# Patient Record
Sex: MALE | Race: WHITE | Employment: FULL TIME | URBAN - METROPOLITAN AREA
[De-identification: names, ages, dates, MRNs, and addresses within clinical notes are randomized per-mention and may not be internally consistent; named-entity substitution may affect disease eponyms.]

---

## 2017-09-11 ENCOUNTER — OFFICE VISIT (OUTPATIENT)
Dept: OPTOMETRY | Facility: CLINIC | Age: 42
End: 2017-09-11
Payer: COMMERCIAL

## 2017-09-11 DIAGNOSIS — H52.13 MYOPIA OF BOTH EYES: Primary | ICD-10-CM

## 2017-09-11 DIAGNOSIS — H52.223 REGULAR ASTIGMATISM OF BOTH EYES: ICD-10-CM

## 2017-09-11 DIAGNOSIS — Z83.511 FAMILY HISTORY OF GLAUCOMA IN MOTHER: ICD-10-CM

## 2017-09-11 PROCEDURE — 92014 COMPRE OPH EXAM EST PT 1/>: CPT | Performed by: OPTOMETRIST

## 2017-09-11 PROCEDURE — 92340 FIT SPECTACLES MONOFOCAL: CPT | Performed by: OPTOMETRIST

## 2017-09-11 PROCEDURE — 92015 DETERMINE REFRACTIVE STATE: CPT | Performed by: OPTOMETRIST

## 2017-09-11 PROCEDURE — 92310 CONTACT LENS FITTING OU: CPT | Mod: GA | Performed by: OPTOMETRIST

## 2017-09-11 ASSESSMENT — KERATOMETRY
OS_AXISANGLE2_DEGREES: 13
OS_K1POWER_DIOPTERS: 47.75
OS_K2POWER_DIOPTERS: 47.50
OD_AXISANGLE2_DEGREES: 18
OD_K1POWER_DIOPTERS: 47.75
OD_K2POWER_DIOPTERS: 47

## 2017-09-11 ASSESSMENT — CUP TO DISC RATIO
OD_RATIO: 0.55
OS_RATIO: 0.65

## 2017-09-11 ASSESSMENT — REFRACTION_CURRENTRX
OD_CYLINDER: -1.25
OD_BRAND: ALCON AIR OPTIX AQUA FOR ASTIGMATISM
OD_SPHERE: -2.75
OS_BRAND: COOPER PROCLEAR 1 DAY
OS_DIAMETER: 14.2
OD_DIAMETER: 14.5
OS_DIAMETER: 14.2
OD_SPHERE: -2.50
OS_SPHERE: -2.50
OD_BASECURVE: 8.7
OD_BASECURVE: 8.7
OD_CYLINDER: -0.75
OD_AXIS: 110
OD_BRAND: ALCON AIR OPTIX AQUA FOR ASTIGMATISM
OS_BASECURVE: 8.7
OD_AXIS: 110
OD_DIAMETER: 14.5
OS_BRAND: COOPER PROCLEAR 1 DAY
OS_SPHERE: -2.50
OS_BASECURVE: 8.7

## 2017-09-11 ASSESSMENT — REFRACTION_MANIFEST
OD_CYLINDER: +1.25
OS_AXIS: 178
OS_SPHERE: -2.75
OD_AXIS: 008
OD_SPHERE: -4.00
METHOD_AUTOREFRACTION: 1
OD_SPHERE: -3.75
OS_AXIS: 165
OD_CYLINDER: +1.25
OS_SPHERE: -2.50
OD_AXIS: 023
OS_CYLINDER: +0.75
OS_CYLINDER: +0.50

## 2017-09-11 ASSESSMENT — VISUAL ACUITY
OD_CC: 20/20
METHOD: SNELLEN - LINEAR
OS_CC: 20/25
OD_CC: 20/20
OS_CC: 20/20
OS_CC: 20/20
OD_CC: 20/20
OD_CC: 20/20
CORRECTION_TYPE: CONTACTS
METHOD: SNELLEN - LINEAR
CORRECTION_TYPE: GLASSES
OS_CC: 20-1

## 2017-09-11 ASSESSMENT — REFRACTION_WEARINGRX
OS_SPHERE: -2.75
SPECS_TYPE: SVL
OS_AXIS: 170
OD_AXIS: 030
OD_CYLINDER: +1.00
OD_SPHERE: -4.00
OS_CYLINDER: +0.50

## 2017-09-11 ASSESSMENT — CONF VISUAL FIELD
OD_NORMAL: 1
METHOD: COUNTING FINGERS
OS_NORMAL: 1

## 2017-09-11 ASSESSMENT — EXTERNAL EXAM - LEFT EYE: OS_EXAM: NORMAL

## 2017-09-11 ASSESSMENT — TONOMETRY
OS_IOP_MMHG: 11
OD_IOP_MMHG: 11
IOP_METHOD: APPLANATION

## 2017-09-11 ASSESSMENT — SLIT LAMP EXAM - LIDS
COMMENTS: NORMAL
COMMENTS: NORMAL

## 2017-09-11 ASSESSMENT — EXTERNAL EXAM - RIGHT EYE: OD_EXAM: NORMAL

## 2017-09-11 NOTE — PROGRESS NOTES
Chief Complaint   Patient presents with     COMPREHENSIVE EYE EXAM     yearly     Contact Lens Re-fitting     fee, waiver      Discontinued regular contact lenses use for 5 months after last exam, now good comfort, denies mucus in left eye    Previous contact lens wearer? Yes: Wearing a monthly in right eye and a daily in the left eye   Comfort of contact lenses :Right eye is good, left eye is dry and itchy by the end of the day.   Thinks that he is currently putting the left lens in backwards. He flips them after he takes them out of the container. (In the past he wore lens backwards on purpose because this made lens more stable.)    Satisfied with current lenses: Yes    Last Eye Exam: 8/29/2016  Dilated Previously: Yes    What are you currently using to see?  glasses and contacts, wears glasses on the weekends and sometimes on long days he'll wear his glasses    Distance Vision Acuity: Satisfied with vision, no changes noticed     Near Vision Acuity: Satisfied with vision while reading and using computer with glasses. Did say that the really fine print doesn't seem to be as easy as it used to be.    Eye Comfort: dry, sometimes itchy   Do you use eye drops? 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     Mother and  maternal grandpa - both take drops     OBJECTIVE: See Ophthalmology exam    ASSESSMENT:    ICD-10-CM    1. Myopia of both eyes H52.13 CONTACT LENS FITTING,BILAT     EYE EXAM (SIMPLE-NONBILLABLE)     REFRACTION   2. Regular astigmatism of both eyes H52.223 CONTACT LENS FITTING,BILAT     EYE EXAM (SIMPLE-NONBILLABLE)     REFRACTION   3. Family history of glaucoma in mother Z83.511 CONTACT LENS FITTING,BILAT     EYE EXAM (SIMPLE-NONBILLABLE)      PLAN:   Due to family history of glaucoma and optic nerve appearance discussed need for yearly eye exam to rule out glaucoma   Patient Instructions   Patient  was advised of today's exam findings.  Optional to fill new glasses prescription, minimal change  Order trials, pick them up. Leave message of preference  New trials  or past contact lenses .  Then contact lenses prescription will be completed and mailed to you.  Contact lenses check appointment as needed   Return in 1 year for eye exam    Lilliana Alejandro O.D.  [facility]   [address] [place], [place] [id number] [phone]

## 2017-09-11 NOTE — PATIENT INSTRUCTIONS
Patient was advised of today's exam findings.  Optional to fill new glasses prescription, minimal change  Order trials, pick them up. Leave message of preference  New trials  or past contact lenses .  Then contact lenses prescription will be completed and mailed to you.  Contact lenses check appointment as needed   Return in 1 year for eye exam    Lilliana Alejandro O.D.  [facility]   [address] [place], [place] [id number] [phone]

## 2017-09-11 NOTE — MR AVS SNAPSHOT
"              After Visit Summary   9/11/2017    Keegan Gallo    MRN: [id number]           Patient Information     Date Of Birth          1975        Visit Information        Provider Department      9/11/2017 5:40 PM Lilliana Alejandro, OD [facility]        Today's Diagnoses     Myopia of both eyes    -  1    Regular astigmatism of both eyes        Family history of glaucoma in mother          Care Instructions    Patient was advised of today's exam findings.  Optional to fill new glasses prescription, minimal change  Order trials, pick them up. Leave message of preference  New trials  or past contact lenses .  Then contact lenses prescription will be completed and mailed to you.  Contact lenses check appointment as needed   Return in 1 year for eye exam    Lilliana Alejandro O.D.  [facility]   [address] [place], [place] [id number] [phone]              Follow-ups after your visit        Who to contact     If you have questions or need follow up information about today's clinic visit or your schedule please contact [facility] directly at [phone].  Normal or non-critical lab and imaging results will be communicated to you by MyChart, letter or phone within 4 business days after the clinic has received the results. If you do not hear from us within 7 days, please contact the clinic through MyChart or phone. If you have a critical or abnormal lab result, we will notify you by phone as soon as possible.  Submit refill requests through [employer] or call your pharmacy and they will forward the refill request to us. Please allow 3 business days for your refill to be completed.          Additional Information About Your Visit        [employer]here[employer] Information     [employer] lets you send messages to your doctor, view your test results, renew your prescriptions, schedule appointments and more. To sign up, go to [email or web address] . Click on \"Log in\" on the left side of " "the screen, which will take you to the Welcome page. Then click on \"Sign up Now\" on the right side of the page.     You will be asked to enter the access code listed below, as well as some personal information. Please follow the directions to create your username and password.     Your access code is: [email or web address]  Expires: 12/10/2017  6:33 PM     Your access code will  in 90 days. If you need help or a new code, please call your [facility] clinic or [phone].        Care EveryWhere ID     This is your Care EveryWhere ID. This could be used by other organizations to access your [facility] medical records  [id number]         Blood Pressure from Last 3 Encounters:   16 118/73   09/22/15 137/79   03/31/15 111/68    Weight from Last 3 Encounters:   16 98.9 kg (218 lb)   09/22/15 101.6 kg (224 lb)   03/31/15 105.7 kg (233 lb)              We Performed the Following     CONTACT LENS FITTING,BILAT     EYE EXAM (SIMPLE-NONBILLABLE)     REFRACTION        Primary Care Provider Office Phone # Fax #    Surya Kemp [phone] [address] [address] [facility] [address]        Equal Access to Services     FARAZ REAL AH: Hadii aad ku hadasho Soomaali, waaxda luqadaha, qaybta kaalmada adeegyada, waxay idiin hayaan elliott juradoaraestephanie mota . So [facility] [phone].    ATENCIÓN: Si habla español, tiene a lemons disposición servicios gratuitos de asistencia lingüística. Llame al [phone].    We comply with applicable federal civil rights laws and [place] laws. We do not discriminate on the basis of race, color, national origin, age, disability sex, sexual orientation or gender identity.            Thank you!     Thank you for choosing [facility]  for your care. Our goal is always to provide you with excellent care. Hearing back from our patients is one way we can continue to improve our services. Please take a few minutes to complete the written survey that you may receive in the " mail after your visit with us. Thank you!             Your Updated Medication List - Protect others around you: Learn how to safely use, store and throw away your medicines at www.disposemymeds.org.          This list is accurate as of: 9/11/17  7:23 PM.  Always use your most recent med list.                   Brand Name Dispense Instructions for use Diagnosis    amoxicillin-clavulanate 875-125 MG per tablet    AUGMENTIN    20 tablet    Take 1 tablet by mouth 2 times daily    Acute maxillary sinusitis, recurrence not specified

## 2017-09-19 ENCOUNTER — OFFICE VISIT (OUTPATIENT)
Dept: FAMILY MEDICINE | Facility: CLINIC | Age: 42
End: 2017-09-19
Payer: COMMERCIAL

## 2017-09-19 VITALS
HEART RATE: 54 BPM | TEMPERATURE: 97.7 F | BODY MASS INDEX: 28.62 KG/M2 | SYSTOLIC BLOOD PRESSURE: 127 MMHG | WEIGHT: 232 LBS | DIASTOLIC BLOOD PRESSURE: 84 MMHG | OXYGEN SATURATION: 98 %

## 2017-09-19 DIAGNOSIS — K21.00 GASTROESOPHAGEAL REFLUX DISEASE WITH ESOPHAGITIS: ICD-10-CM

## 2017-09-19 DIAGNOSIS — R42 VERTIGO: Primary | ICD-10-CM

## 2017-09-19 PROCEDURE — 99214 OFFICE O/P EST MOD 30 MIN: CPT | Performed by: FAMILY MEDICINE

## 2017-09-19 NOTE — MR AVS SNAPSHOT
After Visit Summary   9/19/2017    Keegan Gallo    MRN: [id number]           Patient Information     Date Of Birth          1975        Visit Information        Provider Department      9/19/2017 4:30 PM Surya Kemp MD [facility]        Today's Diagnoses     Vertigo    -  1    Gastroesophageal reflux disease with esophagitis           Follow-ups after your visit        Who to contact     If you have questions or need follow up information about today's clinic visit or your schedule please contact [facility] [place] [facility] directly at [phone].  Normal or non-critical lab and imaging results will be communicated to you by MyChart, letter or phone within 4 business days after the clinic has received the results. If you do not hear from us within 7 days, please contact the clinic through [employer] or phone. If you have a critical or abnormal lab result, we will notify you by phone as soon as possible.  Submit refill requests through [employer] or call your pharmacy and they will forward the refill request to us. Please allow 3 business days for your refill to be completed.          Additional Information About Your Visit        MyChart Information     [employer] gives you secure access to your electronic health record. If you see a primary care provider, you can also send messages to your care team and make appointments. If you have questions, please call your primary care clinic.  If you do not have a primary care provider, please call [phone] and they will assist you.        Care EveryWhere ID     This is your Care EveryWhere ID. This could be used by other organizations to access your [facility] medical records  [id number]        Your Vitals Were     Pulse Temperature Pulse Oximetry BMI (Body Mass Index)          54 97.7  F (36.5  C) (Oral) 98% 28.62 kg/m2         Blood Pressure from Last 3 Encounters:   09/19/17 127/84   12/26/16 118/73   09/22/15 137/79    Weight  from Last 3 Encounters:   09/19/17 232 lb (105.2 kg)   12/26/16 218 lb (98.9 kg)   09/22/15 224 lb (101.6 kg)              Today, you had the following     No orders found for display       Primary Care Provider Office Phone # Fax #    Surya Jerald Kemp [phone] [address] [facility] [address]        Equal Access to Services     FARAZ REAL : Hadii aad ku hadasho Soomaali, waaxda luqadaha, qaybta kaalmada adeegyada, waxay idiin hayaan elliott juradoaraestephanie laarturo . So [facility] [phone].    ATENCIÓN: Si habla español, tiene a lemons disposición servicios gratuitos de asistencia lingüística. Llame al [phone].    We comply with applicable federal civil rights laws and [place] laws. We do not discriminate on the basis of race, color, national origin, age, disability sex, sexual orientation or gender identity.            Thank you!     Thank you for choosing [facility]  for your care. Our goal is always to provide you with excellent care. Hearing back from our patients is one way we can continue to improve our services. Please take a few minutes to complete the written survey that you may receive in the mail after your visit with us. Thank you!             Your Updated Medication List - Protect others around you: Learn how to safely use, store and throw away your medicines at www.disposemymeds.org.      Notice  As of 9/19/2017  4:59 PM    You have not been prescribed any medications.

## 2017-09-19 NOTE — NURSING NOTE
"Chief Complaint   Patient presents with     Dizziness     dizziness and light headed per pt x 1 week now known injury      Gastrophageal Reflux     had some heart burn as well with SX        Initial /84  Pulse 54  Temp 97.7  F (36.5  C) (Oral)  Wt 232 lb (105.2 kg)  SpO2 98%  BMI 28.62 kg/m2 Estimated body mass index is 28.62 kg/(m^2) as calculated from the following:    Height as of 9/22/15: 6' 3.5\" (1.918 m).    Weight as of this encounter: 232 lb (105.2 kg).  Medication Reconciliation: complete      Catherine Alexander MA    "

## 2017-09-19 NOTE — PROGRESS NOTES
SUBJECTIVE:  Keegan Gallo, a 41 year old male scheduled an appointment to discuss the following issues:  vertigo x 1 week and ear pressure. No vertigo issues in past. Room spinning. No ear discharge. Mild sinus discharge. S/p adenoids removal in past.   Hearing ok. . No nausea, vomiting or diarrhea. No fevers or chills. No cardiac issues in past. Mild gerd past week. No chest pain or shortness of breath.   Good exercise tolerance. Running 5 mile/day.   Normal blood pressure and lipids in past. Non-smoker. Emotionally ok.  Limited caffeine but likes chocolate. Later eating recently. No nausea, vomiting or diarrhea or black/bloody stools. No urine changes. .   Past Medical History:   Diagnosis Date     Strabismus        Past Surgical History:   Procedure Laterality Date     ADENOIDECTOMY       EYE SURGERY       ORTHOPEDIC SURGERY       STRABISMUS SURGERY Bilateral     age 4, 5, one SX one eye and two surgeries other eye       Family History   Problem Relation Age of Onset     Glaucoma Mother      rx drops     Hypertension Father      CANCER Maternal Aunt      CANCER Maternal Uncle      CANCER Paternal Aunt      CANCER Paternal Uncle      Macular Degeneration Maternal Grandfather      vision loss     Glaucoma Maternal Grandfather      drops     DIABETES No family hx of      CEREBROVASCULAR DISEASE No family hx of      Thyroid Disease No family hx of        Social History   Substance Use Topics     Smoking status: Never Smoker     Smokeless tobacco: Former User     Alcohol use 0.0 oz/week     0 Standard drinks or equivalent per week      Comment: rarely       ROS:  All other ROS negative  OBJECTIVE:  /84  Pulse 54  Temp 97.7  F (36.5  C) (Oral)  Wt 232 lb (105.2 kg)  SpO2 98%  BMI 28.62 kg/m2  EXAM:  GENERAL APPEARANCE: healthy, alert and no distress  EYES: EOMI,  PERRL  HENT: ear canals and TM's mild fluids behind right TM but not red and nose clear discharge  and mouth without ulcers or  lesions  NECK: no adenopathy, no asymmetry, masses, or scars and thyroid normal to palpation  RESP: lungs clear to auscultation - no rales, rhonchi or wheezes  CV: regular rates and rhythm, normal S1 S2, no S3 or S4 and no murmur, click or rub -  ABDOMEN:  soft, nontender, no HSM or masses and bowel sounds normal  MS: extremities normal- no gross deformities noted, no evidence of inflammation in joints, FROM in all extremities.  SKIN: no suspicious lesions or rashes  NEURO: Normal strength and tone, sensory exam grossly normal, mentation intact and speech normal  NEURO: +ledbetter pike manual  PSYCH: mentation appears normal and affect normal/bright    ASSESSMENT / PLAN:  (R42) Vertigo  (primary encounter diagnosis)  Comment: likely BBPV with +ledbetter pike. Likely inner ear from viral uri/sinus congestion  Plan: chew gum/earing clearing. Avoid sudafed. Over the counter meclizine prn and p.t. Apply exercises discussed. Formal p.t. Or vertigo clinic/ent if not improving. Drink lots of water and limit caffeine. Move head slowly. Return to clinic or urgent care/er if a lot worse or new symptoms.     (K21.0) Gastroesophageal reflux disease with esophagitis  Comment: likely from later eating/chocoloate  Plan: diet changes/no late eating. Over the counter zantac x1 week. If chest pain or shortness of breath to er. Patient deferred EKG but excellent exercise tolerance. Should repeat lipids in winter. Return to clinic if not improving. Call/email with questions/concerns     MD Surya Sales

## 2017-12-26 ENCOUNTER — OFFICE VISIT (OUTPATIENT)
Dept: FAMILY MEDICINE | Facility: CLINIC | Age: 42
End: 2017-12-26
Payer: COMMERCIAL

## 2017-12-26 VITALS
DIASTOLIC BLOOD PRESSURE: 67 MMHG | HEART RATE: 57 BPM | OXYGEN SATURATION: 98 % | WEIGHT: 229 LBS | HEIGHT: 76 IN | BODY MASS INDEX: 27.89 KG/M2 | SYSTOLIC BLOOD PRESSURE: 114 MMHG | TEMPERATURE: 98 F

## 2017-12-26 DIAGNOSIS — R21 RASH: Primary | ICD-10-CM

## 2017-12-26 PROCEDURE — 99213 OFFICE O/P EST LOW 20 MIN: CPT | Performed by: FAMILY MEDICINE

## 2017-12-26 NOTE — NURSING NOTE
"Chief Complaint   Patient presents with     Derm Problem       Initial /67  Pulse 57  Temp 98  F (36.7  C) (Oral)  Ht 6' 4\" (1.93 m)  Wt 229 lb (103.9 kg)  SpO2 98%  BMI 27.87 kg/m2 Estimated body mass index is 27.87 kg/(m^2) as calculated from the following:    Height as of this encounter: 6' 4\" (1.93 m).    Weight as of this encounter: 229 lb (103.9 kg).  Medication Reconciliation: complete  Rhonda Braun CMA    "

## 2017-12-26 NOTE — MR AVS SNAPSHOT
"              After Visit Summary   12/26/2017    Keegan Gallo    MRN: [id number]           Patient Information     Date Of Birth          1975        Visit Information        Provider Department      12/26/2017 4:10 PM Surya Kemp MD [facility]        Today's Diagnoses     Rash    -  1       Follow-ups after your visit        Who to contact     If you have questions or need follow up information about today's clinic visit or your schedule please contact [facility] directly at [phone].  Normal or non-critical lab and imaging results will be communicated to you by MyChart, letter or phone within 4 business days after the clinic has received the results. If you do not hear from us within 7 days, please contact the clinic through [employer]hart or phone. If you have a critical or abnormal lab result, we will notify you by phone as soon as possible.  Submit refill requests through [employer] or call your pharmacy and they will forward the refill request to us. Please allow 3 business days for your refill to be completed.          Additional Information About Your Visit        MyChart Information     [employer] gives you secure access to your electronic health record. If you see a primary care provider, you can also send messages to your care team and make appointments. If you have questions, please call your primary care clinic.  If you do not have a primary care provider, please call [phone] and they will assist you.        Care EveryWhere ID     This is your Care EveryWhere ID. This could be used by other organizations to access your [facility] medical records  [id number]        Your Vitals Were     Pulse Temperature Height Pulse Oximetry BMI (Body Mass Index)       57 98  F (36.7  C) (Oral) 6' 4\" (1.93 m) 98% 27.87 kg/m2        Blood Pressure from Last 3 Encounters:   12/26/17 114/67   09/19/17 127/84   12/26/16 118/73    Weight from Last 3 Encounters:   12/26/17 229 lb " (103.9 kg)   09/19/17 232 lb (105.2 kg)   12/26/16 218 lb (98.9 kg)              Today, you had the following     No orders found for display       Primary Care Provider Office Phone # Fax #    Surya Kemp [phone] [address] [address] [facility] [address]        Equal Access to Services     [facility]: Hadii aad ku hadasho Soomaali, waaxda luqadaha, qaybta kaalmada adeegyada, waxay idiin hayaan adeeg kharash la'aan ah. So [facility] [phone].    ATENCIÓN: Si habla español, tiene a lemons disposición servicios gratuitos de asistencia lingüística. Llame al [phone].    We comply with applicable federal civil rights laws and [place] laws. We do not discriminate on the basis of race, color, national origin, age, disability, sex, sexual orientation, or gender identity.            Thank you!     Thank you for choosing [facility]  for your care. Our goal is always to provide you with excellent care. Hearing back from our patients is one way we can continue to improve our services. Please take a few minutes to complete the written survey that you may receive in the mail after your visit with us. Thank you!             Your Updated Medication List - Protect others around you: Learn how to safely use, store and throw away your medicines at www.disposemymeds.org.      Notice  As of 12/26/2017  4:26 PM    You have not been prescribed any medications.

## 2017-12-26 NOTE — PROGRESS NOTES
"SUBJECTIVE:  Keegan Gallo, a 42 year old male scheduled an appointment to discuss the following issues:  Rash arms/trunk since last night.   Sudden onset. Some itching. No sore throat or rhinorrhea or cough. No nausea, vomiting or diarrhea. No shortness of breath or dysphagia.   5 miles run this AM on [employer]. 5minutes shower. No similar issues in past. No rash contacts. No sick contacts. No fevers or chills.  No changes in soap/detergents. No new foods.   Lesion first noted on right bicep about 1-2 weeks ago.   Medical, social, surgical, and family histories reviewed.    ROS:  All other ROS negative.  OBJECTIVE:  /67  Pulse 57  Temp 98  F (36.7  C) (Oral)  Ht 6' 4\" (1.93 m)  Wt 229 lb (103.9 kg)  SpO2 98%  BMI 27.87 kg/m2  EXAM:  GENERAL APPEARANCE: healthy, alert and no distress  EYES: EOMI,  PERRL  HENT: ear canals and TM's normal and nose and mouth without ulcers or lesions  NECK: no adenopathy, no asymmetry, masses, or scars and thyroid normal to palpation  RESP: lungs clear to auscultation - no rales, rhonchi or wheezes  CV: regular rates and rhythm, normal S1 S2, no S3 or S4 and no murmur, click or rub -  ABDOMEN:  soft, nontender, no HSM or masses and bowel sounds normal  MS: extremities normal- no gross deformities noted, no evidence of inflammation in joints, FROM in all extremities.  SKIN: diffuse spotty macular rash on trunk all <1cm diameter with 1.5 cm patch on right bicep area.   PSYCH: mentation appears normal and affect normal/bright    ASSESSMENT / PLAN:  (R21) Rash  (primary encounter diagnosis)  Comment: likely pityriasis rosea. No other symptoms. Had herald patch 1-2 weeks ago on arm  Plan: possible ezcema too. Consider derm follow-up. Call/email with questions/concerns. Would like to hear back if a lot worse or new symptoms. Expected course and warning signs reviewed. Limit shower time/hot water/soap in winter.  Benadryl qhs or zyrtec prn daytime. Cortisone cream prn too. "     Surya Kemp MD

## 2019-06-15 ENCOUNTER — OFFICE VISIT (OUTPATIENT)
Dept: URGENT CARE | Facility: URGENT CARE | Age: 44
End: 2019-06-15
Payer: COMMERCIAL

## 2019-06-15 VITALS
TEMPERATURE: 98.7 F | HEART RATE: 67 BPM | HEIGHT: 76 IN | BODY MASS INDEX: 24.36 KG/M2 | DIASTOLIC BLOOD PRESSURE: 77 MMHG | WEIGHT: 200 LBS | SYSTOLIC BLOOD PRESSURE: 119 MMHG

## 2019-06-15 DIAGNOSIS — R39.89 URINE TROUBLES: ICD-10-CM

## 2019-06-15 DIAGNOSIS — R31.0 GROSS HEMATURIA: Primary | ICD-10-CM

## 2019-06-15 DIAGNOSIS — R10.9 FLANK PAIN: ICD-10-CM

## 2019-06-15 LAB
ALBUMIN UR-MCNC: 30 MG/DL
APPEARANCE UR: ABNORMAL
BACTERIA #/AREA URNS HPF: ABNORMAL /HPF
BILIRUB UR QL STRIP: ABNORMAL
COLOR UR AUTO: ABNORMAL
GLUCOSE UR STRIP-MCNC: NEGATIVE MG/DL
HGB UR QL STRIP: ABNORMAL
KETONES UR STRIP-MCNC: NEGATIVE MG/DL
LEUKOCYTE ESTERASE UR QL STRIP: NEGATIVE
MUCOUS THREADS #/AREA URNS LPF: PRESENT /LPF
NITRATE UR QL: NEGATIVE
PH UR STRIP: 5.5 PH (ref 5–7)
RBC #/AREA URNS AUTO: >100 /HPF
SOURCE: ABNORMAL
SP GR UR STRIP: >1.03 (ref 1–1.03)
UROBILINOGEN UR STRIP-ACNC: 0.2 EU/DL (ref 0.2–1)
WBC #/AREA URNS AUTO: ABNORMAL /HPF

## 2019-06-15 PROCEDURE — 81001 URINALYSIS AUTO W/SCOPE: CPT | Performed by: FAMILY MEDICINE

## 2019-06-15 PROCEDURE — 99214 OFFICE O/P EST MOD 30 MIN: CPT | Performed by: FAMILY MEDICINE

## 2019-06-15 ASSESSMENT — MIFFLIN-ST. JEOR: SCORE: 1913.69

## 2019-06-15 NOTE — PROGRESS NOTES
Subjective     Keegan Gallo is a 43 year old male who presents to clinic today for the following health issues:    HPI   Chief Complaint   Patient presents with     Urinary Problem     blood in urine, first noticed Thursday after running, has happened 4-5 times since after running         Duration: few days     Description (location/character/radiation): hematuria, flank pain, urinary urgency     Intensity:  moderate    Accompanying signs and symptoms: no fever, chills, penile discharge     History (similar episodes/previous evaluation): None    Precipitating or alleviating factors: None    Therapies tried and outcome: none        Patient Active Problem List   Diagnosis     Exotropia, alternating     CARDIOVASCULAR SCREENING; LDL GOAL LESS THAN 160     High triglycerides     Giant papillary conjunctivitis left eye     Family history of glaucoma in mother     Past Surgical History:   Procedure Laterality Date     ADENOIDECTOMY       EYE SURGERY       ORTHOPEDIC SURGERY       STRABISMUS SURGERY Bilateral     age 4, 5, one SX one eye and two surgeries other eye       Social History     Tobacco Use     Smoking status: Never Smoker     Smokeless tobacco: Former User   Substance Use Topics     Alcohol use: Yes     Alcohol/week: 0.0 oz     Comment: rarely     Family History   Problem Relation Age of Onset     Glaucoma Mother         rx drops     Hypertension Father      Cancer Maternal Aunt      Cancer Maternal Uncle      Cancer Paternal Aunt      Cancer Paternal Uncle      Macular Degeneration Maternal Grandfather         vision loss     Glaucoma Maternal Grandfather         drops     Diabetes No family hx of      Cerebrovascular Disease No family hx of      Thyroid Disease No family hx of          No current outpatient medications on file.     No Known Allergies  Recent Labs   Lab Test 09/19/15  0953      HDL 26*   TRIG 314*   ALT 22   CR 0.92   GFRESTIMATED >90  Non  GFR Calc    "  GFRESTBLACK >90   GFR Calc     POTASSIUM 4.3      BP Readings from Last 3 Encounters:   06/15/19 119/77   12/26/17 114/67   09/19/17 127/84    Wt Readings from Last 3 Encounters:   06/15/19 90.7 kg (200 lb)   12/26/17 103.9 kg (229 lb)   09/19/17 105.2 kg (232 lb)                    Reviewed and updated as needed this visit by Provider         Review of Systems    ROS: 10 point ROS neg other than the symptoms noted above in the HPI.      Objective    /77   Pulse 67   Temp 98.7  F (37.1  C) (Oral)   Ht 1.93 m (6' 4\")   Wt 90.7 kg (200 lb)   BMI 24.34 kg/m    Body mass index is 24.34 kg/m .  Physical Exam   GENERAL: alert and no distress  EYES: Eyes grossly normal to inspection, PERRL and conjunctivae and sclerae normal  HENT: normal cephalic/atraumatic, ear canals and TM's normal, oropharynx clear and oral mucous membranes moist  NECK: no adenopathy, no asymmetry, masses, or scars and thyroid normal to palpation  RESP: lungs clear to auscultation - no rales, rhonchi or wheezes  CV: regular rates and rhythm, normal S1 S2, no S3 or S4 and no murmur, click or rub  ABDOMEN: soft, nontender, no CVA tenderness  MS: no gross musculoskeletal defects noted, no edema      Results for orders placed or performed in visit on 06/15/19   UA reflex to Microscopic and Culture   Result Value Ref Range    Color Urine Brown     Appearance Urine Cloudy     Glucose Urine Negative NEG^Negative mg/dL    Bilirubin Urine Small (A) NEG^Negative    Ketones Urine Negative NEG^Negative mg/dL    Specific Gravity Urine >1.030 1.003 - 1.035    Blood Urine Large (A) NEG^Negative    pH Urine 5.5 5.0 - 7.0 pH    Protein Albumin Urine 30 (A) NEG^Negative mg/dL    Urobilinogen Urine 0.2 0.2 - 1.0 EU/dL    Nitrite Urine Negative NEG^Negative    Leukocyte Esterase Urine Negative NEG^Negative    Source Midstream Urine    Urine Microscopic   Result Value Ref Range    WBC Urine 0 - 5 OTO5^0 - 5 /HPF    RBC Urine >100 (A) OTO2^O - " 2 /HPF    Bacteria Urine Few (A) NEG^Negative /HPF    Mucous Urine Present (A) NEG^Negative /LPF       Assessment & Plan     1. Gross hematuria/Flank pain  -Differential discussed in detail including nephro/ureterolithiasis.  UA findings explained.  Needs further imaging to delineate a specific diagnosis.  Suggested to go ER for further evaluation and management.  Patient understood and in agreement with above plan.  All questions answered.      2. Urine troubles  - UA reflex to Microscopic and Culture  - Urine Microscopic         Jose Velazquez MD  [facility]

## 2019-06-19 ENCOUNTER — TRANSFERRED RECORDS (OUTPATIENT)
Dept: HEALTH INFORMATION MANAGEMENT | Facility: CLINIC | Age: 44
End: 2019-06-19

## 2019-07-10 ENCOUNTER — TRANSFERRED RECORDS (OUTPATIENT)
Dept: HEALTH INFORMATION MANAGEMENT | Facility: CLINIC | Age: 44
End: 2019-07-10

## 2019-09-28 ENCOUNTER — HEALTH MAINTENANCE LETTER (OUTPATIENT)
Age: 44
End: 2019-09-28

## 2019-11-25 ENCOUNTER — OFFICE VISIT (OUTPATIENT)
Dept: OPTOMETRY | Facility: CLINIC | Age: 44
End: 2019-11-25
Payer: COMMERCIAL

## 2019-11-25 DIAGNOSIS — H52.13 MYOPIA OF BOTH EYES: Primary | ICD-10-CM

## 2019-11-25 DIAGNOSIS — H50.15 EXOTROPIA, ALTERNATING: ICD-10-CM

## 2019-11-25 DIAGNOSIS — H52.223 REGULAR ASTIGMATISM OF BOTH EYES: ICD-10-CM

## 2019-11-25 DIAGNOSIS — Z83.511 FAMILY HISTORY OF GLAUCOMA IN MOTHER: ICD-10-CM

## 2019-11-25 PROCEDURE — 92014 COMPRE OPH EXAM EST PT 1/>: CPT | Performed by: OPTOMETRIST

## 2019-11-25 PROCEDURE — 92015 DETERMINE REFRACTIVE STATE: CPT | Performed by: OPTOMETRIST

## 2019-11-25 PROCEDURE — 92310 CONTACT LENS FITTING OU: CPT | Mod: GA | Performed by: OPTOMETRIST

## 2019-11-25 ASSESSMENT — REFRACTION_WEARINGRX
OD_CYLINDER: +1.25
OS_CYLINDER: +0.50
SPECS_TYPE: SVL
OS_SPHERE: -2.75
OD_AXIS: 023
OS_AXIS: 165
OD_AXIS: 023
SPECS_TYPE: SVL
OS_AXIS: 165
OD_CYLINDER: +1.25
OD_SPHERE: -3.75
OS_SPHERE: -2.75
OD_SPHERE: -3.75
OS_CYLINDER: +0.50

## 2019-11-25 ASSESSMENT — VISUAL ACUITY
OS_CC: 20/20
OD_CC: 20/20
OS_CC: 20/20
METHOD: SNELLEN - LINEAR
CORRECTION_TYPE: GLASSES
OD_CC: 20/20

## 2019-11-25 ASSESSMENT — REFRACTION_MANIFEST
OS_CYLINDER: +0.75
OD_AXIS: 025
OS_SPHERE: -3.50
OD_SPHERE: -3.75
OD_AXIS: 009
OD_SPHERE: -4.00
OS_AXIS: 006
OS_AXIS: 172
OD_CYLINDER: +1.50
OD_CYLINDER: +1.50
METHOD_AUTOREFRACTION: 1
OS_SPHERE: -2.75
OS_CYLINDER: +1.00

## 2019-11-25 ASSESSMENT — REFRACTION_CURRENTRX
OD_CYLINDER: -0.75
OD_BRAND: ALCON AIR OPTIX AQUA FOR ASTIGMATISM
OS_BRAND: COOPER PROCLEAR 1 DAY
OD_SPHERE: -2.75
OD_BASECURVE: 8.7
OS_DIAMETER: 14.2
OD_DIAMETER: 14.5
OS_SPHERE: -2.50
OS_BASECURVE: 8.7
OD_AXIS: 110

## 2019-11-25 ASSESSMENT — KERATOMETRY
OS_K1POWER_DIOPTERS: 48.00
OD_K2POWER_DIOPTERS: 47.25
OS_K2POWER_DIOPTERS: 47.50
OS_AXISANGLE2_DEGREES: 179
OD_K1POWER_DIOPTERS: 48.25
OD_AXISANGLE2_DEGREES: 17

## 2019-11-25 ASSESSMENT — SLIT LAMP EXAM - LIDS
COMMENTS: NORMAL
COMMENTS: NORMAL

## 2019-11-25 ASSESSMENT — EXTERNAL EXAM - LEFT EYE: OS_EXAM: NORMAL

## 2019-11-25 ASSESSMENT — CONF VISUAL FIELD
METHOD: COUNTING FINGERS
OS_NORMAL: 1
OD_NORMAL: 1

## 2019-11-25 ASSESSMENT — CUP TO DISC RATIO
OD_RATIO: 0.55
OS_RATIO: 0.65

## 2019-11-25 ASSESSMENT — TONOMETRY
OS_IOP_MMHG: 12
OD_IOP_MMHG: 12
IOP_METHOD: APPLANATION

## 2019-11-25 ASSESSMENT — EXTERNAL EXAM - RIGHT EYE: OD_EXAM: NORMAL

## 2019-11-25 NOTE — PROGRESS NOTES
Chief Complaint   Patient presents with     Annual Eye Exam     Contact Lens Re-fitting        Previous contact lens wearer? Yes: wears monthly in right eye and a daily in the left eye   Comfort of contact lenses :Good, hardly wears them during the school year, and only a couple days per week in the summer 8 hrs maximum -wonders if allergy to school since the remodel- poor comfort 1 month after school starts  Satisfied with current lenses: Yes        Last Eye Exam: 9/11/2017  Dilated Previously: Yes    What are you currently using to see?  glasses and contacts on occasion    Distance Vision Acuity: Satisfied with vision, not aware of any changes     Near Vision Acuity: Not as good with the contacts     Eye Comfort: good and dry sometimes, more often with the contacts   Do you use eye drops? : No  Occupation or Hobbies: Teacher       Tracy Ly Optometric Assistant      Medical, surgical and family histories reviewed and updated 11/25/2019.       OBJECTIVE: See Ophthalmology exam    ASSESSMENT:    ICD-10-CM    1. Myopia of both eyes H52.13 EYE EXAM (SIMPLE-NONBILLABLE)     REFRACTION     CONTACT LENS FITTING,BILAT   2. Regular astigmatism of both eyes H52.223 EYE EXAM (SIMPLE-NONBILLABLE)     REFRACTION     CONTACT LENS FITTING,BILAT   3. Exotropia, alternating H50.15 EYE EXAM (SIMPLE-NONBILLABLE)     REFRACTION     CONTACT LENS FITTING,BILAT   4. Family history of glaucoma in mother Z83.511 EYE EXAM (SIMPLE-NONBILLABLE)     REFRACTION     CONTACT LENS FITTING,BILAT      PLAN:     Patient Instructions   Patient was advised of today's exam findings.  Fill glasses prescription  Contact lenses prescription released to patient today.  Return in 1 year for eye exam    Lilliana Alejandro O.D.  [facility]   [address] [address], [place] [id number] [phone]    To order your contacts online please log on to [email or web address] .  Go to the link which is found on the [facility] page.    Another option  is to call  [phone] to order your contacts.

## 2019-11-26 NOTE — PATIENT INSTRUCTIONS
Patient was advised of today's exam findings.  Fill glasses prescription  Contact lenses prescription released to patient today.  Return in 1 year for eye exam    Lilliana Alejandro O.D.  [place] [address] [place] [address]  [phone]    To order your contacts online please log on to [email or web address] .  Go to the link which is found on the [facility] page.    Another option is to call  [phone] to order your contacts.

## 2019-12-06 ENCOUNTER — OFFICE VISIT (OUTPATIENT)
Dept: OPTOMETRY | Facility: CLINIC | Age: 44
End: 2019-12-06
Payer: COMMERCIAL

## 2019-12-06 PROCEDURE — 92340 FIT SPECTACLES MONOFOCAL: CPT | Mod: GA | Performed by: OPTOMETRIST

## 2021-01-10 ENCOUNTER — HEALTH MAINTENANCE LETTER (OUTPATIENT)
Age: 46
End: 2021-01-10

## 2021-07-01 ENCOUNTER — TRANSFERRED RECORDS (OUTPATIENT)
Dept: HEALTH INFORMATION MANAGEMENT | Facility: CLINIC | Age: 46
End: 2021-07-01

## 2021-07-07 ENCOUNTER — TELEPHONE (OUTPATIENT)
Dept: FAMILY MEDICINE | Facility: CLINIC | Age: 46
End: 2021-07-07

## 2021-07-07 NOTE — TELEPHONE ENCOUNTER
Patient called asking who Dr. Kemp would recommend for Cardiology rehab and a Cardiologist.  He said with his insurance he can only go to [facility].

## 2021-07-07 NOTE — TELEPHONE ENCOUNTER
I've not seen in 4 years. Lets get a hospital; follow-up visit and I'll discuss and place referall. Can do video visit too. Surya Kemp MD

## 2021-07-08 ENCOUNTER — VIRTUAL VISIT (OUTPATIENT)
Dept: FAMILY MEDICINE | Facility: CLINIC | Age: 46
End: 2021-07-08
Payer: COMMERCIAL

## 2021-07-08 DIAGNOSIS — I25.10 CORONARY ARTERY DISEASE INVOLVING NATIVE HEART WITHOUT ANGINA PECTORIS, UNSPECIFIED VESSEL OR LESION TYPE: Primary | ICD-10-CM

## 2021-07-08 DIAGNOSIS — E78.1 HIGH TRIGLYCERIDES: ICD-10-CM

## 2021-07-08 DIAGNOSIS — Z95.1 S/P CABG (CORONARY ARTERY BYPASS GRAFT): ICD-10-CM

## 2021-07-08 PROCEDURE — 99495 TRANSJ CARE MGMT MOD F2F 14D: CPT | Mod: GT | Performed by: FAMILY MEDICINE

## 2021-07-08 RX ORDER — ATORVASTATIN CALCIUM 80 MG/1
80 TABLET, FILM COATED ORAL
COMMUNITY
Start: 2021-07-06 | End: 2021-07-26

## 2021-07-08 RX ORDER — NITROGLYCERIN 0.4 MG/1
0.4 TABLET SUBLINGUAL
COMMUNITY
Start: 2021-07-06

## 2021-07-08 RX ORDER — AMOXICILLIN 250 MG
2 CAPSULE ORAL
COMMUNITY
Start: 2021-07-06 | End: 2021-08-11

## 2021-07-08 RX ORDER — EZETIMIBE 10 MG/1
10 TABLET ORAL
COMMUNITY
Start: 2021-07-07 | End: 2021-07-26

## 2021-07-08 RX ORDER — CYCLOBENZAPRINE HCL 10 MG
10 TABLET ORAL
COMMUNITY
Start: 2021-07-06 | End: 2021-08-11

## 2021-07-08 RX ORDER — METOPROLOL SUCCINATE 50 MG/1
50 TABLET, EXTENDED RELEASE ORAL
COMMUNITY
Start: 2021-07-06 | End: 2021-07-26

## 2021-07-08 RX ORDER — HYDROMORPHONE HYDROCHLORIDE 2 MG/1
2 TABLET ORAL
COMMUNITY
Start: 2021-07-06 | End: 2021-07-12

## 2021-07-08 RX ORDER — ASPIRIN 325 MG
325 TABLET ORAL
COMMUNITY
Start: 2021-07-07 | End: 2021-08-11

## 2021-07-08 NOTE — PROGRESS NOTES
"Keegan is a 45 year old who is being evaluated via a billable video visit.      Follow-up syncope/MI and cabg 7/1/2021. [facility]. Started on asa/lipitor/zetia/toprol and given prn\"nitroglycerin\"}/dilaudid  Was going to do shent but unable.   Pain overall improving with tylenol. No leg wounds. No fevers or chills. No pus/bleeding from wounds.   Appetite hit/miss but improving. No nausea, vomiting or diarrhea or black/bloody stools. Taking laxative but taking laxvative. Breathing overall. Mild cough with talking. Using spirometry.   Taking full asa. Sleep improving overall.    and home life ok. Emotionally doing up/down but improving. No iron pill.   How would you like to obtain your AVS? MyChart  If the video visit is dropped, the invitation should be resent by: Text to cell phone: [phone]  Will anyone else be joining your video visit? No    Video Start Time: 2:35 PM    ASSESSMENT / PLAN:  (I25.10) Coronary artery disease involving native heart without angina pectoris, unspecified vessel or lesion type  (primary encounter diagnosis)  Comment: breathing improving.   Plan: CARDIOLOGY EVAL ADULT REFERRAL, CARDIAC REHAB         REFERRAL        Continue meds. Needs cardiology through [facility] with insurance. Worsening chest pain or shortness of breath to er.     (E78.1) High triglycerides  Comment: on zetia/lipitor  Plan: Reveiwed risks and side effects of medication  Fasting labs/ appointment in one month.     (Z95.1) S/P CABG (coronary artery bypass graft)  Comment: overall doing ok. Off pain meds. No bleeding  Plan: CARDIOLOGY EVAL ADULT REFERRAL, CARDIAC REHAB         REFERRAL        Follow-up cardiac rehab.   More iron in diet and return to clinic 4 week recheck hgb/etc.   Discussed sleep and emotional health but doing ok and good support system. Call/email with questions/concerns         Subjective   Keegan is a 45 year old who presents for the " following health issues    HPI       Hospital Follow-up Visit:    Hospital/Nursing Home/IP Rehab Facility: [facility]  Date of Admission: 6/28/21  Date of Discharge: 07/06/21  Reason(s) for Admission: Fainted- ST elevation myocardial infraction       Was your hospitalization related to COVID-19? No   Problems taking medications regularly:  No   Medication changes since discharge: yes  Problems adhering to non-medication therapy:  None    Summary of hospitalization:  CareEverywhere information obtained and reviewed  Diagnostic Tests/Treatments reviewed.  Follow up needed: yes  Other Healthcare Providers Involved in Patient s Care:         cardiology  Update since discharge: improved. Post Discharge Medication Reconciliation: discharge medications reconciled, continue medications without change.  Plan of care communicated with patient              Review of Systems   All other ROS negative. No urine changes.       Objective           Vitals:  No vitals were obtained today due to virtual visit.    Physical Exam   GENERAL: Healthy, alert and no distress  EYES: Eyes grossly normal to inspection.  No discharge or erythema, or obvious scleral/conjunctival abnormalities.  RESP: No audible wheeze, cough, or visible cyanosis.  No visible retractions or increased work of breathing.    SKIN: Visible skin clear. No significant rash, abnormal pigmentation or lesions.  NEURO: Cranial nerves grossly intact.  Mentation and speech appropriate for age.  PSYCH: Mentation appears normal, affect normal/bright, judgement and insight intact, normal speech and appearance well-groomed.            Video-Visit Details    Type of service:  Video Visit    Video End Time:2:41 PM    Originating Location (pt. Location): Home    Distant Location (provider location):  [facility]     Platform used for Video Visit: Yin

## 2021-07-08 NOTE — Clinical Note
Please give patient numbers for cardiology and cardiac rehab to call if not contacted. I'd like follow-up md appointment myself in one month med recheck with previsit fasting labs. Surya Kmep MD

## 2021-07-08 NOTE — PROGRESS NOTES
Called patient, left a detailed message of the cardiology and cardiac rehab scheduling numbers.  Also informed patient to follow up with Dr. Kemp in 1 month with fasting PVL a couple days prior to appointment.  Juhi Parmar

## 2021-07-13 ENCOUNTER — TELEPHONE (OUTPATIENT)
Dept: FAMILY MEDICINE | Facility: CLINIC | Age: 46
End: 2021-07-13

## 2021-07-13 DIAGNOSIS — Z95.1 S/P CABG (CORONARY ARTERY BYPASS GRAFT): Primary | ICD-10-CM

## 2021-07-13 NOTE — TELEPHONE ENCOUNTER
I spoke to the patient and he wants to go to [facility].  I faxed the Cardiology referral to [facility] at [facility] to fax #[phone].  I gave the patient their direct number [phone] to schedule his appointment.  Ashley Botello,  [facility]

## 2021-07-13 NOTE — TELEPHONE ENCOUNTER
Patient calling and states that he found out his insurnace has an open network and he would like his   CARDIAC REHAB REFERRAL  CARDIOLOGY EVAL ADULT REFERRAL  To [facility].  This is closer to his house.  Ailyn Bai MA  [facility]  2nd Floor  Primary Care

## 2021-07-26 ENCOUNTER — MYC MEDICAL ADVICE (OUTPATIENT)
Dept: FAMILY MEDICINE | Facility: CLINIC | Age: 46
End: 2021-07-26

## 2021-07-26 DIAGNOSIS — E78.1 HIGH TRIGLYCERIDES: Primary | ICD-10-CM

## 2021-07-26 DIAGNOSIS — I10 HYPERTENSION GOAL BP (BLOOD PRESSURE) < 140/90: ICD-10-CM

## 2021-07-26 RX ORDER — ATORVASTATIN CALCIUM 80 MG/1
80 TABLET, FILM COATED ORAL DAILY
Qty: 90 TABLET | Refills: 1 | Status: SHIPPED | OUTPATIENT
Start: 2021-07-26 | End: 2022-02-21

## 2021-07-26 RX ORDER — EZETIMIBE 10 MG/1
10 TABLET ORAL DAILY
Qty: 90 TABLET | Refills: 1 | Status: SHIPPED | OUTPATIENT
Start: 2021-07-26 | End: 2022-01-15

## 2021-07-26 RX ORDER — METOPROLOL SUCCINATE 50 MG/1
50 TABLET, EXTENDED RELEASE ORAL DAILY
Qty: 90 TABLET | Refills: 1 | Status: SHIPPED | OUTPATIENT
Start: 2021-07-26 | End: 2021-10-07

## 2021-07-28 ENCOUNTER — TELEPHONE (OUTPATIENT)
Dept: CARDIOLOGY | Facility: CLINIC | Age: 46
End: 2021-07-28

## 2021-07-28 ENCOUNTER — MYC MEDICAL ADVICE (OUTPATIENT)
Dept: FAMILY MEDICINE | Facility: CLINIC | Age: 46
End: 2021-07-28

## 2021-07-28 DIAGNOSIS — I10 HYPERTENSION GOAL BP (BLOOD PRESSURE) < 140/90: ICD-10-CM

## 2021-07-28 DIAGNOSIS — Z95.1 S/P CABG (CORONARY ARTERY BYPASS GRAFT): ICD-10-CM

## 2021-07-28 DIAGNOSIS — E78.1 HIGH TRIGLYCERIDES: Primary | ICD-10-CM

## 2021-07-28 NOTE — TELEPHONE ENCOUNTER
Health Call Center    Phone Message    May a detailed message be left on voicemail: no     Reason for Call: Other: Yasmeen called from [facility]. She would like to advise Dr Uribe the Pt Keegan was in Afib today 7/28/2021 during his cardiac rehab session.  He is currently taking Amiodarone and Xarelto. His heart rate has been in the 140's, and Yasmeen would like Dr Uribe's input as to what the parameters should be for heart rate with exercise. Please reach out to Yasmeen with any questions or concerns, she can be reached at [phone].     Action Taken: Other: [facility]    Travel Screening: Not Applicable

## 2021-07-29 NOTE — TELEPHONE ENCOUNTER
This pt has not been seen by Dr Uribe yet. He is a new pt scheduled end of August.     Pts primary cardiologist will need to give parameters.     Chart reviewed. Appears pt was in patient at [facility] and had CABG then post op afib.     Referred back to Michelle for parameters as pt has not been seen by our cardiologists yet at this time.     Verbalized understanding

## 2021-08-02 ENCOUNTER — OFFICE VISIT (OUTPATIENT)
Dept: FAMILY MEDICINE | Facility: CLINIC | Age: 46
End: 2021-08-02
Payer: COMMERCIAL

## 2021-08-02 VITALS
TEMPERATURE: 97.8 F | SYSTOLIC BLOOD PRESSURE: 129 MMHG | HEIGHT: 76 IN | BODY MASS INDEX: 26.3 KG/M2 | HEART RATE: 50 BPM | WEIGHT: 216 LBS | OXYGEN SATURATION: 96 % | DIASTOLIC BLOOD PRESSURE: 84 MMHG

## 2021-08-02 DIAGNOSIS — Z95.1 S/P CABG (CORONARY ARTERY BYPASS GRAFT): ICD-10-CM

## 2021-08-02 DIAGNOSIS — I48.0 PAF (PAROXYSMAL ATRIAL FIBRILLATION) (H): ICD-10-CM

## 2021-08-02 DIAGNOSIS — D62 ANEMIA DUE TO BLOOD LOSS, ACUTE: Primary | ICD-10-CM

## 2021-08-02 DIAGNOSIS — E78.1 HIGH TRIGLYCERIDES: ICD-10-CM

## 2021-08-02 PROCEDURE — 99214 OFFICE O/P EST MOD 30 MIN: CPT | Performed by: FAMILY MEDICINE

## 2021-08-02 RX ORDER — AMIODARONE HYDROCHLORIDE 200 MG/1
200 TABLET ORAL
COMMUNITY
Start: 2021-07-20 | End: 2021-08-22

## 2021-08-02 RX ORDER — ASPIRIN 81 MG/1
81 TABLET ORAL DAILY
COMMUNITY

## 2021-08-02 RX ORDER — FERROUS SULFATE 325(65) MG
325 TABLET, DELAYED RELEASE (ENTERIC COATED) ORAL DAILY
Qty: 90 TABLET | Refills: 0 | Status: SHIPPED | OUTPATIENT
Start: 2021-08-02 | End: 2021-10-20

## 2021-08-02 ASSESSMENT — MIFFLIN-ST. JEOR: SCORE: 1976.27

## 2021-08-02 NOTE — PROGRESS NOTES
SUBJECTIVE:  Keegan Gallo, a 45 year old male scheduled an appointment to discuss the following issues:  Follow-up ed visit for possible PAF and s/p CABG. Seeing cardiology. Given amiodarone and xarelto. hgb = 9.9 and normal renal panel. No iron pills currently. No black/bloody stools. Eating meat. Asa - dropped to 81mg. ?duration of xarelto - going to see cardiology 3 weeks. LIGHTHEADED improving overall -a little with getting up. Caffeine - none. No ALCOHOL. No outside blood pressure. No chest pain except at incision site.   Breathing overall improving.   Per ed note:  Impression and Plan:   Keegan Gallo is a 45 y.o. male with a history of coronary bypass four weeks ago developed paroxysmal atrial fibrillation while in cardiac rehab today. When he arrived to the emergency department, he was in normal sinus rhythm. He was only mildly symptomatic when he had the atrial fibrillation. The patient remained asymptomatic during his emergency department course. I discussed his findings with Dr. Chalo Sifuentes from Cardiology. He recommended the patient go home on a DOAC and Amiodarone 200 mg daily for six weeks. We discussed that he is mildly anemic with a hemoglobin of 9.9, which is likely related to his recent surgery; serial hemoglobins show that he is gradually improving since the time of his surgery. The patient has no signs of active bleeding and does not have any other contraindications to anticoagulation. I discussed anticoagulation and rate control with Amiodarone along with side effects and risks of medicines in detail with the patient. He was instructed to reduce his aspirin dose from 3 to 25 mg to 81 mg daily, at the recommendation of the cardiologist. Prescriptions were sent for the medicines, and he was told to schedule a follow up appointment with the cardiology clinic within the next 7-10 days. Other diagnoses that were considered included acute coronary syndrome, acute MI, pulmonary embolism,  "and malignant arrhythmia.    Medical, social, surgical, and family histories reviewed.    ROS:  All other ROS negatie    OBJECTIVE:  /84   Pulse 50   Temp 97.8  F (36.6  C) (Tympanic)   Ht 1.93 m (6' 4\")   Wt 98 kg (216 lb)   SpO2 96%   BMI 26.29 kg/m    EXAM:  GENERAL APPEARANCE: healthy, alert and no distress  EYES: EOMI,  PERRL  HENT: ear canals and TM's normal and nose and mouth without ulcers or lesions  RESP: lungs clear to auscultation - no rales, rhonchi or wheezes  CV: regular rates and rhythm, normal S1 S2, no S3 or S4 and no murmur, click or rub -  ABDOMEN:  soft, nontender, no HSM or masses and bowel sounds normal  MS: extremities normal- no gross deformities noted, no evidence of inflammation in joints, FROM in all extremities.  PSYCH: mentation appears normal and affect normal/bright    ASSESSMENT / PLAN:  (D62) Anemia due to blood loss, acute  (primary encounter diagnosis)  Comment: likely form surgery. No active bleeding  Plan: ferrous sulfate (FE TABS) 325 (65 Fe) MG EC         tablet        Start iron pills and monitor recheck in 2 months - sooner if worsening fatigue/etc. Expected course and warning signs reviewed. Call/email with questions/concerns     (Z95.1) S/P CABG (coronary artery bypass graft)  Comment: stable?  Plan: per cardiology. Worsening chest pain or shortness of breath to er    (I48.0) PAF (paroxysmal atrial fibrillation) (H)  Comment: in sinus  Plan: continue meds and follow-up cards. Get a smart watch and monitor.     (E78.1) High triglycerides  Comment: lipitor causing some leg pains  Plan: can try 40mg lipitor do see if helpful. Consider dropping zetia. Fasting labs in 2 months.     Surya Kemp MD     "

## 2021-08-11 ENCOUNTER — ANCILLARY PROCEDURE (OUTPATIENT)
Dept: CARDIOLOGY | Facility: CLINIC | Age: 46
End: 2021-08-11
Attending: FAMILY MEDICINE
Payer: COMMERCIAL

## 2021-08-11 ENCOUNTER — MYC MEDICAL ADVICE (OUTPATIENT)
Dept: FAMILY MEDICINE | Facility: CLINIC | Age: 46
End: 2021-08-11

## 2021-08-11 ENCOUNTER — OFFICE VISIT (OUTPATIENT)
Dept: FAMILY MEDICINE | Facility: CLINIC | Age: 46
End: 2021-08-11
Payer: COMMERCIAL

## 2021-08-11 VITALS
OXYGEN SATURATION: 99 % | TEMPERATURE: 97.3 F | BODY MASS INDEX: 25.81 KG/M2 | HEART RATE: 50 BPM | DIASTOLIC BLOOD PRESSURE: 70 MMHG | SYSTOLIC BLOOD PRESSURE: 113 MMHG | WEIGHT: 212 LBS

## 2021-08-11 DIAGNOSIS — R31.0 GROSS HEMATURIA: Primary | ICD-10-CM

## 2021-08-11 DIAGNOSIS — I48.0 PAROXYSMAL ATRIAL FIBRILLATION (H): ICD-10-CM

## 2021-08-11 LAB
ALBUMIN UR-MCNC: NEGATIVE MG/DL
APPEARANCE UR: CLEAR
BACTERIA #/AREA URNS HPF: ABNORMAL /HPF
BASOPHILS # BLD AUTO: 0 10E3/UL (ref 0–0.2)
BASOPHILS NFR BLD AUTO: 0 %
BILIRUB UR QL STRIP: NEGATIVE
COLOR UR AUTO: YELLOW
EOSINOPHIL # BLD AUTO: 0.5 10E3/UL (ref 0–0.7)
EOSINOPHIL NFR BLD AUTO: 5 %
ERYTHROCYTE [DISTWIDTH] IN BLOOD BY AUTOMATED COUNT: 13.6 % (ref 10–15)
GLUCOSE UR STRIP-MCNC: NEGATIVE MG/DL
HCT VFR BLD AUTO: 37 % (ref 40–53)
HGB BLD-MCNC: 11.7 G/DL (ref 13.3–17.7)
HGB UR QL STRIP: ABNORMAL
KETONES UR STRIP-MCNC: NEGATIVE MG/DL
LEUKOCYTE ESTERASE UR QL STRIP: NEGATIVE
LYMPHOCYTES # BLD AUTO: 2.5 10E3/UL (ref 0.8–5.3)
LYMPHOCYTES NFR BLD AUTO: 22 %
MCH RBC QN AUTO: 28.1 PG (ref 26.5–33)
MCHC RBC AUTO-ENTMCNC: 31.6 G/DL (ref 31.5–36.5)
MCV RBC AUTO: 89 FL (ref 78–100)
MONOCYTES # BLD AUTO: 0.9 10E3/UL (ref 0–1.3)
MONOCYTES NFR BLD AUTO: 8 %
NEUTROPHILS # BLD AUTO: 7.6 10E3/UL (ref 1.6–8.3)
NEUTROPHILS NFR BLD AUTO: 65 %
NITRATE UR QL: NEGATIVE
PH UR STRIP: 6.5 [PH] (ref 5–7)
PLATELET # BLD AUTO: 367 10E3/UL (ref 150–450)
RBC # BLD AUTO: 4.16 10E6/UL (ref 4.4–5.9)
RBC #/AREA URNS AUTO: ABNORMAL /HPF
SP GR UR STRIP: <=1.005 (ref 1–1.03)
UROBILINOGEN UR STRIP-ACNC: 0.2 E.U./DL
WBC # BLD AUTO: 11.6 10E3/UL (ref 4–11)
WBC #/AREA URNS AUTO: ABNORMAL /HPF

## 2021-08-11 PROCEDURE — 36415 COLL VENOUS BLD VENIPUNCTURE: CPT | Performed by: FAMILY MEDICINE

## 2021-08-11 PROCEDURE — 81001 URINALYSIS AUTO W/SCOPE: CPT | Performed by: FAMILY MEDICINE

## 2021-08-11 PROCEDURE — 93227 XTRNL ECG REC<48 HR R&I: CPT | Performed by: INTERNAL MEDICINE

## 2021-08-11 PROCEDURE — 93225 XTRNL ECG REC<48 HRS REC: CPT | Performed by: INTERNAL MEDICINE

## 2021-08-11 PROCEDURE — 85025 COMPLETE CBC W/AUTO DIFF WBC: CPT | Performed by: FAMILY MEDICINE

## 2021-08-11 PROCEDURE — 99214 OFFICE O/P EST MOD 30 MIN: CPT | Performed by: FAMILY MEDICINE

## 2021-08-11 ASSESSMENT — PAIN SCALES - GENERAL: PAINLEVEL: NO PAIN (0)

## 2021-08-11 NOTE — PROGRESS NOTES
I have place a 24 Hour Zio Patch on this patient. Patient was given Zio Patch instructions and [employer] contact information. Patient understands when they should remove and return their monitor to [employer].

## 2021-08-11 NOTE — NURSING NOTE
"Chief Complaint   Patient presents with     UTI     blood in urine last light noticed.        Initial /70   Pulse 50   Temp 97.3  F (36.3  C) (Tympanic)   Wt 96.2 kg (212 lb)   SpO2 99%   BMI 25.81 kg/m   Estimated body mass index is 25.81 kg/m  as calculated from the following:    Height as of 8/2/21: 1.93 m (6' 4\").    Weight as of this encounter: 96.2 kg (212 lb).  Medication Reconciliation: complete  Gabrielle Kearney CMA  "

## 2021-08-22 DIAGNOSIS — I48.0 PAF (PAROXYSMAL ATRIAL FIBRILLATION) (H): Primary | ICD-10-CM

## 2021-08-23 RX ORDER — AMIODARONE HYDROCHLORIDE 200 MG/1
200 TABLET ORAL DAILY
Qty: 30 TABLET | Refills: 0 | Status: SHIPPED | OUTPATIENT
Start: 2021-08-23 | End: 2021-08-26

## 2021-08-26 ENCOUNTER — TELEPHONE (OUTPATIENT)
Dept: CARDIOLOGY | Facility: CLINIC | Age: 46
End: 2021-08-26

## 2021-08-26 ENCOUNTER — OFFICE VISIT (OUTPATIENT)
Dept: CARDIOLOGY | Facility: CLINIC | Age: 46
End: 2021-08-26
Attending: FAMILY MEDICINE
Payer: COMMERCIAL

## 2021-08-26 VITALS
SYSTOLIC BLOOD PRESSURE: 143 MMHG | OXYGEN SATURATION: 99 % | DIASTOLIC BLOOD PRESSURE: 81 MMHG | HEART RATE: 50 BPM | BODY MASS INDEX: 25.68 KG/M2 | WEIGHT: 211 LBS

## 2021-08-26 DIAGNOSIS — E78.5 HYPERLIPIDEMIA WITH TARGET LDL LESS THAN 70: ICD-10-CM

## 2021-08-26 DIAGNOSIS — Z95.1 S/P CABG (CORONARY ARTERY BYPASS GRAFT): ICD-10-CM

## 2021-08-26 DIAGNOSIS — I21.4 NSTEMI (NON-ST ELEVATED MYOCARDIAL INFARCTION) (H): Primary | ICD-10-CM

## 2021-08-26 PROCEDURE — 99205 OFFICE O/P NEW HI 60 MIN: CPT | Performed by: INTERNAL MEDICINE

## 2021-08-26 RX ORDER — ZINC SULFATE 66 MG
TABLET ORAL
COMMUNITY

## 2021-08-26 NOTE — NURSING NOTE
"Chief Complaint   Patient presents with     Coronary Artery Disease     Dr Uribe: new pt. history of coronary artery disease, MI and NSTEMI CAD/CABG(07/08/2021)   Parox Afib. Hx of HLD, HTN.        Initial BP (!) 143/81 (BP Location: Right arm, Patient Position: Chair, Cuff Size: Adult Regular)   Pulse 50   Wt 95.7 kg (211 lb)   SpO2 99%   BMI 25.68 kg/m   Estimated body mass index is 25.68 kg/m  as calculated from the following:    Height as of 8/2/21: 1.93 m (6' 4\").    Weight as of this encounter: 95.7 kg (211 lb)..  BP completed using cuff size: regular    Lori Sanchez MA  "

## 2021-08-26 NOTE — LETTER
August 26, 2021      TO: Keegan Gallo  [address] [address]           To Whom It May Concern,    Keegan Gallo was seen today in our Cardiology Department at the [facility] by Dr. VONDA Uribe for a cardiac consultation. Based on Dr. Uribe's assessment, Keegan Gallo may return to work.        Sincerely,          Dr VONDA Uribe Cardiologist  [facility] Mail Code [address] [place] [address]

## 2021-08-26 NOTE — PATIENT INSTRUCTIONS
Thank you for coming to the [facility] [place]; please note the following instructions:    1. Per Dr. Uribe, finish your supply of amiodarone and Xarelto and then STOP.    2.  Follow up in 6 months with Dr. Uribe    3.  Letter completed to return back to work        If you have any questions regarding your visit please contact your care team:     Cardiology  Telephone Number   Yarelis TALLEY., RN  Yusra SUE, RN   Iliana SOUZA, RMA  Lori BONNER, RMDELPHINE SMITH, LPN   [phone] (option 1)   For scheduling appts:     [phone] (select option 1)       For the Device Clinic (Pacemakers and ICD's)  RN's :  Tona Jaime   During business hours: [phone]    *After business hours:  [phone] (select option 4)      Normal test result notifications will be released via [employer] or mailed within 7 business days.  All other test results, will be communicated via telephone once reviewed by your cardiologist.    If you need a medication refill please contact your pharmacy.  Please allow 3 business days for your refill to be completed.    As always, thank you for trusting us with your health care needs!

## 2021-08-26 NOTE — PROGRESS NOTES
SUBJECTIVE:  Keegan Gallo is a 45 year old male who presents for establishing cardiac care.  Status post CABG x1, LIMA to LAD.  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  The days he lifts weight he runs 2 to 3 miles.  In no weightlifting he will run 5 to 8 miles.  Lately he had some chest discomfort at around 3 miles of running.  He walks a little bit and then he will be able to continue and finish his run.  Also used to feel weak legs with running.  On 28 June this year after running at school patient had a syncopal episode.  He was admitted to [facility].  He had mild troponin elevation.  Subsequently patient had a CT coronary angiogram which showed severe proximal LAD stenosis.  Subsequently patient had a coronary angiogram and the team was uncomfortable to proceed with the stent.  So patient had single-vessel CABG using LIMA to LAD on 7/1/2021.  Postop patient had significant bleeding through the chest tubes and has to be reopen.  Since then patient remain asymptomatic.  Patient did have few episodes of atrial fibrillation.  Last episode was when patient was in cardiac rehab.  Patient was sent to the emergency room though on arrival he was in sinus rhythm with a rate of around 55 bpm.  Patient do have intermittent atrial fibrillation.  He was started on amiodarone 200 mg daily and Eliquis.  Currently patient of another 26 days of medications left.  His cardiac risk factors are hyperlipidemia and a strong family history of premature coronary artery disease.  Father had 5 vessel CABG at age 60.  He is a non-smoker.  No hypertension or diabetes.    Patient Active Problem List    Diagnosis Date Noted     PAF (paroxysmal atrial fibrillation) (H) 08/02/2021     Priority: Medium     Coronary artery disease involving native heart without angina pectoris, unspecified vessel or lesion type 07/08/2021     Priority: Medium     S/P CABG  (coronary artery bypass graft) 07/08/2021     Priority: Medium     Family history of glaucoma in mother 09/11/2017     Priority: Medium     Giant papillary conjunctivitis left eye 08/29/2016     Priority: Medium     High triglycerides 09/22/2015     Priority: Medium     CARDIOVASCULAR SCREENING; LDL GOAL LESS THAN 160 08/26/2015     Priority: Medium     Exotropia, alternating 08/24/2015     Priority: Medium    .  Current Outpatient Medications   Medication Sig     amiodarone (PACERONE) 200 MG tablet Take 1 tablet (200 mg) by mouth daily     aspirin 81 MG EC tablet Take 81 mg by mouth daily     atorvastatin (LIPITOR) 80 MG tablet Take 1 tablet (80 mg) by mouth daily     Cholecalciferol (VITAMIN D3) 25 MCG (1000 UT) CAPS      ezetimibe (ZETIA) 10 MG tablet Take 1 tablet (10 mg) by mouth daily     ferrous sulfate (FE TABS) 325 (65 Fe) MG EC tablet Take 1 tablet (325 mg) by mouth daily     metoprolol succinate ER (TOPROL-XL) 50 MG 24 hr tablet Take 1 tablet (50 mg) by mouth daily     rivaroxaban ANTICOAGULANT (XARELTO) 20 MG TABS tablet Take 1 tablet (20 mg) by mouth daily (with dinner)     Zinc Sulfate (ZINC 15) 66 MG TABS      nitroGLYcerin (NITROSTAT) 0.4 MG sublingual tablet Place 0.4 mg under the tongue     No current facility-administered medications for this visit.     Past Medical History:   Diagnosis Date     Heart disease 6-28-21    Had bypass surgery on 7-1-21     History of blood transfusion 7-1-21    1 unit blood 1 unit plasma     Strabismus      Past Surgical History:   Procedure Laterality Date     ABDOMEN SURGERY      [place] when i was a baby     ADENOIDECTOMY       AMPUTATION  9-11-98    First joint left digit one of doot (bog toe)     CARDIAC SURGERY  7-1-21    Single bypass surgery     EYE SURGERY       ORTHOPEDIC SURGERY       STRABISMUS SURGERY Bilateral     age 4, 5, one SX one eye and two surgeries other eye     No Known Allergies  Social History     Socioeconomic History     Marital status:       Spouse name: Not on file     Number of children: Not on file     Years of education: Not on file     Highest education level: Not on file   Occupational History     Not on file   Tobacco Use     Smoking status: Never Smoker     Smokeless tobacco: Former User   Substance and Sexual Activity     Alcohol use: Yes     Alcohol/week: 0.0 standard drinks     Comment: rarely     Drug use: No     Sexual activity: Yes     Partners: Female   Other Topics Concern     Parent/sibling w/ CABG, MI or angioplasty before 65F 55M? No   Social History Narrative     Not on file     Social Determinants of Health     Financial Resource Strain:      Difficulty of Paying Living Expenses:    Food Insecurity:      Worried About Running Out of Food in the Last Year:      Ran Out of Food in the Last Year:    Transportation Needs:      Lack of Transportation (Medical):      Lack of Transportation (Non-Medical):    Physical Activity:      Days of Exercise per Week:      Minutes of Exercise per Session:    Stress:      Feeling of Stress :    Social Connections:      Frequency of Communication with Friends and Family:      Frequency of Social Gatherings with Friends and Family:      Attends Islam Services:      Active Member of Clubs or Organizations:      Attends Club or Organization Meetings:      Marital Status:    Intimate Partner Violence:      Fear of Current or Ex-Partner:      Emotionally Abused:      Physically Abused:      Sexually Abused:      Family History   Problem Relation Age of Onset     Glaucoma Mother         rx drops     Hypertension Father      Cancer Maternal Aunt      Cancer Maternal Uncle      Cancer Paternal Aunt      Cancer Paternal Uncle      Macular Degeneration Maternal Grandfather         vision loss     Glaucoma Maternal Grandfather         drops     Diabetes No family hx of      Cerebrovascular Disease No family hx of      Thyroid Disease No family hx of           REVIEW OF SYSTEMS:  General:  negative, fever, chills, night sweats  Skin: negative, acne, rash and scaling  Eyes: negative, double vision, eye pain and photophobia  Ears/Nose/Throat: negative, nasal congestion and purulent rhinorrhea  Respiratory: No dyspnea on exertion, No cough, No hemoptysis and negative  Cardiovascular: negative, palpitations, tachycardia, irregular heart beat, chest pain, exertional chest pain or pressure, paroxysmal nocturnal dyspnea, dyspnea on exertion and orthopnea         OBJECTIVE:  Blood pressure (!) 143/81, pulse 50, weight 95.7 kg (211 lb), SpO2 99 %.  General Appearance: healthy, alert, active and no distress  Head: Normocephalic. No masses, lesions, tenderness or abnormalities  Eyes: conjuctiva clear, PERRL, EOM intact  Ears: External ears normal. Canals clear. TM's normal.  Nose: Nares normal  Mouth: normal  Neck: Supple, no cervical adenopathy, no thyromegaly  Lungs: clear to auscultation  Cardiac: regular rate and rhythm, normal S1 and S2, no murmur         ASSESSMENT/PLAN:  Patient here for establishing cardiac care.  On 7/1/2021 patient had single-vessel CABG.  LIMA to proximal LAD.  [place] [facility] [place] episode on 6/28/2021.  Had mild troponin elevation.  Had a CT coronary angiogram showing severe proximal LAD disease.  Subsequently had a coronary angiogram and the team was uncomfortable to stent.  Patient had single-vessel CABG on about date.  Postop he had significant bleeding and reexploration of the chest.  Since then patient is progressing well though he had intermittent episodes of atrial fibrillation.  Last episode was in cardiac rehab.  Patient was sent to the emergency room but by the time he reached he was in sinus rhythm.  Patient has minimal symptoms with atrial fibrillation.  Currently he is on amiodarone and Eliquis.  He has enough supply for 26 days.  Denied cardiac symptoms.  His cardiac risk factors are hyperlipidemia and family history of premature coronary artery  disease.  [place] [facility]  Patient had a normal KALE showing normal LV function and no regional wall motion abnormalities preop.  Patient had a recent Holter monitor.  This showed 2% of A. fib burden.  Patient is advised to continue his amiodarone till he finishes the dose.  If he still have atrial fibrillation will approach differently considering his age and he is asymptomatic.  Patient will return to clinic in 6 months time for a follow-up visit.  In the interim if the A. fib recurs patient will contact the clinic.  Per orders.   Return to Clinic 6 months.  Will have an echo and EKG at that time.  Total visit duration 60 minutes.  This includes face-to-face interview, physical exam, chart review, review of Care Everywhere, Holter report and documentation.

## 2021-08-26 NOTE — LETTER
8/26/2021      RE: Keegan Gallo  [address] [address] [address]       Dear Colleague,    Thank you for the opportunity to participate in the care of your patient, Keegan Gallo, at the [facility]. Please see a copy of my visit note below.       SUBJECTIVE:  Keegan Gallo is a 45 year old male who presents for establishing cardiac care.  Status post CABG x1, LIMA to LAD.  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  The days he lifts weight he runs 2 to 3 miles.  In no weightlifting he will run 5 to 8 miles.  Lately he had some chest discomfort at around 3 miles of running.  He walks a little bit and then he will be able to continue and finish his run.  Also used to feel weak legs with running.  On 28 June this year after running at school patient had a syncopal episode.  He was admitted to [facility].  He had mild troponin elevation.  Subsequently patient had a CT coronary angiogram which showed severe proximal LAD stenosis.  Subsequently patient had a coronary angiogram and the team was uncomfortable to proceed with the stent.  So patient had single-vessel CABG using LIMA to LAD on 7/1/2021.  Postop patient had significant bleeding through the chest tubes and has to be reopen.  Since then patient remain asymptomatic.  Patient did have few episodes of atrial fibrillation.  Last episode was when patient was in cardiac rehab.  Patient was sent to the emergency room though on arrival he was in sinus rhythm with a rate of around 55 bpm.  Patient do have intermittent atrial fibrillation.  He was started on amiodarone 200 mg daily and Eliquis.  Currently patient of another 26 days of medications left.  His cardiac risk factors are hyperlipidemia and a strong family history of premature coronary artery disease.  Father had 5 vessel  CABG at age 60.  He is a non-smoker.  No hypertension or diabetes.    Patient Active Problem List    Diagnosis Date Noted     PAF (paroxysmal atrial fibrillation) (H) 08/02/2021     Priority: Medium     Coronary artery disease involving native heart without angina pectoris, unspecified vessel or lesion type 07/08/2021     Priority: Medium     S/P CABG (coronary artery bypass graft) 07/08/2021     Priority: Medium     Family history of glaucoma in mother 09/11/2017     Priority: Medium     Giant papillary conjunctivitis left eye 08/29/2016     Priority: Medium     High triglycerides 09/22/2015     Priority: Medium     CARDIOVASCULAR SCREENING; LDL GOAL LESS THAN 160 08/26/2015     Priority: Medium     Exotropia, alternating 08/24/2015     Priority: Medium    .  Current Outpatient Medications   Medication Sig     amiodarone (PACERONE) 200 MG tablet Take 1 tablet (200 mg) by mouth daily     aspirin 81 MG EC tablet Take 81 mg by mouth daily     atorvastatin (LIPITOR) 80 MG tablet Take 1 tablet (80 mg) by mouth daily     Cholecalciferol (VITAMIN D3) 25 MCG (1000 UT) CAPS      ezetimibe (ZETIA) 10 MG tablet Take 1 tablet (10 mg) by mouth daily     ferrous sulfate (FE TABS) 325 (65 Fe) MG EC tablet Take 1 tablet (325 mg) by mouth daily     metoprolol succinate ER (TOPROL-XL) 50 MG 24 hr tablet Take 1 tablet (50 mg) by mouth daily     rivaroxaban ANTICOAGULANT (XARELTO) 20 MG TABS tablet Take 1 tablet (20 mg) by mouth daily (with dinner)     Zinc Sulfate (ZINC 15) 66 MG TABS      nitroGLYcerin (NITROSTAT) 0.4 MG sublingual tablet Place 0.4 mg under the tongue     No current facility-administered medications for this visit.     Past Medical History:   Diagnosis Date     Heart disease 6-28-21    Had bypass surgery on 7-1-21     History of blood transfusion 7-1-21    1 unit blood 1 unit plasma     Strabismus      Past Surgical History:   Procedure Laterality Date     ABDOMEN SURGERY      [place] when i was a baby      ADENOIDECTOMY       AMPUTATION  9-11-98    First joint left digit one of doot (bog toe)     CARDIAC SURGERY  7-1-21    Single bypass surgery     EYE SURGERY       ORTHOPEDIC SURGERY       STRABISMUS SURGERY Bilateral     age 4, 5, one SX one eye and two surgeries other eye     No Known Allergies  Social History     Socioeconomic History     Marital status:      Spouse name: Not on file     Number of children: Not on file     Years of education: Not on file     Highest education level: Not on file   Occupational History     Not on file   Tobacco Use     Smoking status: Never Smoker     Smokeless tobacco: Former User   Substance and Sexual Activity     Alcohol use: Yes     Alcohol/week: 0.0 standard drinks     Comment: rarely     Drug use: No     Sexual activity: Yes     Partners: Female   Other Topics Concern     Parent/sibling w/ CABG, MI or angioplasty before 65F 55M? No   Social History Narrative     Not on file     Social Determinants of Health     Financial Resource Strain:      Difficulty of Paying Living Expenses:    Food Insecurity:      Worried About Running Out of Food in the Last Year:      Ran Out of Food in the Last Year:    Transportation Needs:      Lack of Transportation (Medical):      Lack of Transportation (Non-Medical):    Physical Activity:      Days of Exercise per Week:      Minutes of Exercise per Session:    Stress:      Feeling of Stress :    Social Connections:      Frequency of Communication with Friends and Family:      Frequency of Social Gatherings with Friends and Family:      Attends Sabianism Services:      Active Member of Clubs or Organizations:      Attends Club or Organization Meetings:      Marital Status:    Intimate Partner Violence:      Fear of Current or Ex-Partner:      Emotionally Abused:      Physically Abused:      Sexually Abused:      Family History   Problem Relation Age of Onset     Glaucoma Mother         rx drops     Hypertension Father      Cancer Maternal  Aunt      Cancer Maternal Uncle      Cancer Paternal Aunt      Cancer Paternal Uncle      Macular Degeneration Maternal Grandfather         vision loss     Glaucoma Maternal Grandfather         drops     Diabetes No family hx of      Cerebrovascular Disease No family hx of      Thyroid Disease No family hx of           REVIEW OF SYSTEMS:  General: negative, fever, chills, night sweats  Skin: negative, acne, rash and scaling  Eyes: negative, double vision, eye pain and photophobia  Ears/Nose/Throat: negative, nasal congestion and purulent rhinorrhea  Respiratory: No dyspnea on exertion, No cough, No hemoptysis and negative  Cardiovascular: negative, palpitations, tachycardia, irregular heart beat, chest pain, exertional chest pain or pressure, paroxysmal nocturnal dyspnea, dyspnea on exertion and orthopnea         OBJECTIVE:  Blood pressure (!) 143/81, pulse 50, weight 95.7 kg (211 lb), SpO2 99 %.  General Appearance: healthy, alert, active and no distress  Head: Normocephalic. No masses, lesions, tenderness or abnormalities  Eyes: conjuctiva clear, PERRL, EOM intact  Ears: External ears normal. Canals clear. TM's normal.  Nose: Nares normal  Mouth: normal  Neck: Supple, no cervical adenopathy, no thyromegaly  Lungs: clear to auscultation  Cardiac: regular rate and rhythm, normal S1 and S2, no murmur         ASSESSMENT/PLAN:  Patient here for establishing cardiac care.  On 7/1/2021 patient had single-vessel CABG.  LIMA to proximal LAD.  [place] [facility] [place]28/2021.  Had mild troponin elevation.  Had a CT coronary angiogram showing severe proximal LAD disease.  Subsequently had a coronary angiogram and the team was uncomfortable to stent.  Patient had single-vessel CABG on about date.  Postop he had significant bleeding and reexploration of the chest.  Since then patient is progressing well though he had intermittent episodes of atrial fibrillation.  Last episode was in cardiac rehab.   Patient was sent to the emergency room but by the time he reached he was in sinus rhythm.  Patient has minimal symptoms with atrial fibrillation.  Currently he is on amiodarone and Eliquis.  He has enough supply for 26 days.  Denied cardiac symptoms.  His cardiac risk factors are hyperlipidemia and family history of premature coronary artery disease.  [place] [facility]  Patient had a normal KALE showing normal LV function and no regional wall motion abnormalities preop.  Patient had a recent Holter monitor.  This showed 2% of A. fib burden.  Patient is advised to continue his amiodarone till he finishes the dose.  If he still have atrial fibrillation will approach differently considering his age and he is asymptomatic.  Patient will return to clinic in 6 months time for a follow-up visit.  In the interim if the A. fib recurs patient will contact the clinic.  Per orders.   Return to Clinic 6 months.  Will have an echo and EKG at that time.  Total visit duration 60 minutes.  This includes face-to-face interview, physical exam, chart review, review of Care Everywhere, Holter report and documentation.      Please do not hesitate to contact me if you have any questions/concerns.     Sincerely,     SURESH Mojica MD

## 2021-08-26 NOTE — TELEPHONE ENCOUNTER
[facility] Call Center    Phone Message    May a detailed message be left on voicemail: yes     Reason for Call: Other: Aparna calling from [facility] cardiac rehab in regards to mutual pt. She reports that at cardiac rehab, pt's heart rate dropped to as low as 45bpm, bp 128/70, and when exercising got as high as 110bpm. She reports pt is due to stop amiodarone but she wanted to make sure  was aware of low heart rates. Please call back if any questions.      Action Taken: Message routed to:  [facility] cardio    Travel Screening: Not Applicable

## 2021-08-30 NOTE — CONFIDENTIAL NOTE
Dr. Uribe is aware.  Nothing to do per Dr. Uribe since patient was advised to stop amiodarone at 8/26/21 office visit.    Yarelis Dent RN  Cardiology Care Coordinator  [facility]  [phone] option 1

## 2021-10-06 ENCOUNTER — TELEPHONE (OUTPATIENT)
Dept: CARDIOLOGY | Facility: CLINIC | Age: 46
End: 2021-10-06

## 2021-10-06 NOTE — TELEPHONE ENCOUNTER
[facility]    Phone Message    May a detailed message be left on voicemail: yes     Reason for Call: Other: Nya calling stating that Keegan has been having low 40's heart rates at rest.  She states he is asymptomatic and his blood pressure, systolically is at 90-low 100's. She just wanted to give an update. Thank you!     Action Taken: Message routed to:  [facility] Cardio    Travel Screening: Not Applicable

## 2021-10-07 ENCOUNTER — MYC MEDICAL ADVICE (OUTPATIENT)
Dept: CARDIOLOGY | Facility: CLINIC | Age: 46
End: 2021-10-07

## 2021-10-07 DIAGNOSIS — I10 HYPERTENSION GOAL BP (BLOOD PRESSURE) < 140/90: ICD-10-CM

## 2021-10-07 RX ORDER — METOPROLOL SUCCINATE 25 MG/1
25 TABLET, EXTENDED RELEASE ORAL DAILY
Qty: 90 TABLET | Refills: 1 | Status: SHIPPED | OUTPATIENT
Start: 2021-10-07 | End: 2022-03-24

## 2021-10-07 NOTE — TELEPHONE ENCOUNTER
SURESH Mojica MD  You 17 hours ago (5:15 PM)     KM    Cut it down to half the dose. KP    Message text      Per Dr. Uribe, decrease metoprolol to 25 mg daily.      Left message for patient to call clinic.   Yarelis Dent RN

## 2021-10-07 NOTE — TELEPHONE ENCOUNTER
Left message for patient to call clinic.  Also stated a [employer] message was sent with instructions.  Please see 10/7/21 [employer] message.  Yarelis Dent RN

## 2021-10-07 NOTE — TELEPHONE ENCOUNTER
[facility]    Phone Message    May a detailed message be left on voicemail: yes     Reason for Call: Other: Keegan calling Yarelis back. Please call. Thank you!     Action Taken: Message routed to:  [facility] Cardio    Travel Screening: Not Applicable

## 2021-10-20 ENCOUNTER — MYC REFILL (OUTPATIENT)
Dept: FAMILY MEDICINE | Facility: CLINIC | Age: 46
End: 2021-10-20

## 2021-10-20 DIAGNOSIS — D62 ANEMIA DUE TO BLOOD LOSS, ACUTE: ICD-10-CM

## 2021-10-21 RX ORDER — FERROUS SULFATE 325(65) MG
325 TABLET, DELAYED RELEASE (ENTERIC COATED) ORAL DAILY
Qty: 90 TABLET | Refills: 0 | Status: SHIPPED | OUTPATIENT
Start: 2021-10-21 | End: 2022-03-03

## 2021-10-23 ENCOUNTER — HEALTH MAINTENANCE LETTER (OUTPATIENT)
Age: 46
End: 2021-10-23

## 2021-10-26 ENCOUNTER — E-VISIT (OUTPATIENT)
Dept: FAMILY MEDICINE | Facility: CLINIC | Age: 46
End: 2021-10-26
Payer: COMMERCIAL

## 2021-10-26 DIAGNOSIS — R05.9 COUGH: ICD-10-CM

## 2021-10-26 DIAGNOSIS — R09.81 CONGESTION OF PARANASAL SINUS: Primary | ICD-10-CM

## 2021-10-26 PROCEDURE — 99421 OL DIG E/M SVC 5-10 MIN: CPT | Performed by: FAMILY MEDICINE

## 2021-10-26 RX ORDER — CEFUROXIME AXETIL 500 MG/1
500 TABLET ORAL 2 TIMES DAILY
Qty: 14 TABLET | Refills: 0 | Status: SHIPPED | OUTPATIENT
Start: 2021-10-26 | End: 2021-11-02

## 2021-11-19 ENCOUNTER — MYC MEDICAL ADVICE (OUTPATIENT)
Dept: CARDIOLOGY | Facility: CLINIC | Age: 46
End: 2021-11-19
Payer: COMMERCIAL

## 2022-01-14 DIAGNOSIS — E78.1 HIGH TRIGLYCERIDES: ICD-10-CM

## 2022-01-14 NOTE — TELEPHONE ENCOUNTER
"Requested Prescriptions   Pending Prescriptions Disp Refills    ezetimibe (ZETIA) 10 MG tablet [Pharmacy Med Name: Ezetimibe Oral Tablet 10 MG] 90 tablet 0     Sig: Take 1 tablet (10 mg) by mouth daily        Antihyperlipidemic agents Failed - 1/14/2022  2:26 PM        Failed - Lipid panel on file in past 12 mos       Recent Labs   Lab Test 09/19/15  0953   CHOL 197   TRIG 314*   HDL 26*      VLDL 63*   CHOLHDLRATIO 7.6*               Failed - Normal serum ALT on record in past 12 mos     Recent Labs   Lab Test 09/19/15  0953   ALT 22               Passed - Recent (12 mo) or future (30 days) visit within the authorizing provider's specialty     Patient has had an office visit with the authorizing provider or a provider within the authorizing providers department within the previous 12 mos or has a future within next 30 days. See \"Patient Info\" tab in inbasket, or \"Choose Columns\" in Meds & Orders section of the refill encounter.              Passed - Medication is active on med list        Passed - Patient is age 18 years or older              "

## 2022-01-15 RX ORDER — EZETIMIBE 10 MG/1
10 TABLET ORAL DAILY
Qty: 30 TABLET | Refills: 0 | Status: SHIPPED | OUTPATIENT
Start: 2022-01-15 | End: 2022-02-21

## 2022-02-12 ENCOUNTER — HEALTH MAINTENANCE LETTER (OUTPATIENT)
Age: 47
End: 2022-02-12

## 2022-02-15 ENCOUNTER — TELEPHONE (OUTPATIENT)
Dept: CARDIOLOGY | Facility: CLINIC | Age: 47
End: 2022-02-15
Payer: COMMERCIAL

## 2022-02-15 NOTE — TELEPHONE ENCOUNTER
Pt needs an echocardiogram before his 3/3 appt with Dr. Uribe. I spoke with the pt's wife and she stated she will give her  the number to call our specialty schedulers and schedule.    Deja Gutierres, Visit Facilitator

## 2022-02-19 DIAGNOSIS — E78.1 HIGH TRIGLYCERIDES: ICD-10-CM

## 2022-02-19 NOTE — LETTER
February 22, 2022    Keegan Gallo  [address] [address] [address]    Dear Keegan,       We recently received a refill request for atorvastatin and ezetimbe.  We have refilled this for a one time 30 day supply only because you are due for a:    Physical office visit and fasting lab appointment-needed in the next 1-2 monthsper Dr Kemp      Please schedule this lab appointment 4-5 days prior to the office visit.     Please call at your earliest convenience so that there will not be a delay with your future refills.          Thank you,   Your [facility] Team/  [phone]

## 2022-02-21 ENCOUNTER — ANCILLARY PROCEDURE (OUTPATIENT)
Dept: CARDIOLOGY | Facility: CLINIC | Age: 47
End: 2022-02-21
Attending: INTERNAL MEDICINE
Payer: COMMERCIAL

## 2022-02-21 DIAGNOSIS — I21.4 NSTEMI (NON-ST ELEVATED MYOCARDIAL INFARCTION) (H): ICD-10-CM

## 2022-02-21 DIAGNOSIS — E78.5 HYPERLIPIDEMIA WITH TARGET LDL LESS THAN 70: ICD-10-CM

## 2022-02-21 DIAGNOSIS — Z95.1 S/P CABG (CORONARY ARTERY BYPASS GRAFT): ICD-10-CM

## 2022-02-21 LAB — LVEF ECHO: NORMAL

## 2022-02-21 PROCEDURE — 93306 TTE W/DOPPLER COMPLETE: CPT | Mod: GC | Performed by: STUDENT IN AN ORGANIZED HEALTH CARE EDUCATION/TRAINING PROGRAM

## 2022-02-21 RX ORDER — EZETIMIBE 10 MG/1
TABLET ORAL
Qty: 30 TABLET | Refills: 0 | Status: SHIPPED | OUTPATIENT
Start: 2022-02-21 | End: 2022-03-03

## 2022-02-21 RX ORDER — ATORVASTATIN CALCIUM 80 MG/1
80 TABLET, FILM COATED ORAL DAILY
Qty: 90 TABLET | Refills: 0 | Status: SHIPPED | OUTPATIENT
Start: 2022-02-21 | End: 2022-06-03

## 2022-02-21 NOTE — TELEPHONE ENCOUNTER
Routing refill request to provider for review/approval because:  Labs not current:    Lab Results   Component Value Date    ALT 22 09/19/2015     Lab Results   Component Value Date    CHOL 197 09/19/2015     Lab Results   Component Value Date    HDL 26 09/19/2015     Lab Results   Component Value Date     09/19/2015     Lab Results   Component Value Date    TRIG 314 09/19/2015     Lab Results   Component Value Date    CHOLHDLRATIO 7.6 09/19/2015     Lesia Smalls RN

## 2022-03-03 ENCOUNTER — OFFICE VISIT (OUTPATIENT)
Dept: CARDIOLOGY | Facility: CLINIC | Age: 47
End: 2022-03-03
Payer: COMMERCIAL

## 2022-03-03 VITALS
SYSTOLIC BLOOD PRESSURE: 105 MMHG | DIASTOLIC BLOOD PRESSURE: 63 MMHG | OXYGEN SATURATION: 97 % | HEART RATE: 56 BPM | WEIGHT: 220 LBS | BODY MASS INDEX: 26.78 KG/M2

## 2022-03-03 DIAGNOSIS — R42 DIZZINESS: ICD-10-CM

## 2022-03-03 DIAGNOSIS — I21.4 NSTEMI (NON-ST ELEVATED MYOCARDIAL INFARCTION) (H): Primary | ICD-10-CM

## 2022-03-03 DIAGNOSIS — E78.5 HYPERLIPIDEMIA WITH TARGET LDL LESS THAN 70: ICD-10-CM

## 2022-03-03 DIAGNOSIS — Z95.1 STATUS POST AORTO-CORONARY ARTERY BYPASS GRAFT: ICD-10-CM

## 2022-03-03 PROCEDURE — 99214 OFFICE O/P EST MOD 30 MIN: CPT | Performed by: INTERNAL MEDICINE

## 2022-03-03 NOTE — PATIENT INSTRUCTIONS
Thank you for coming to the [facility] @ [facility]; please note the following instructions:    1. STOP Zetia-follow up with primary care to recheck cholesterol    2.  Blood pressure monitor sent to pharmacy-not always covered by insurance.  If you pay out of pocket, [employer] brand blood pressure cuff is recommended.    3.  Monitor home blood pressure for 2 weeks and send a message with daily readings to Dr. Uribe via [employer]    4.  Dr. Uribe recommends to follow up in 1 year.  The cardiology team will contact you to schedule when the time gets closer.        If you have any questions regarding your visit please contact your care team:     Cardiology  Telephone Number   Yarelis TALLEY., RN  Yusra SUE, RN   Iliana SOUZA, RMA  Lori BONNER, NAGI SMITH, REALN  Deja SIMON, Visit Facilitator   [phone] (option 1)   For scheduling appts:     [phone] (select option 1)       For the Device Clinic (Pacemakers and ICD's)  RN's :  Tona Jaime   During business hours: [phone]    *After business hours:  [phone] (select option 4)      Normal test result notifications will be released via [employer] or mailed within 7 business days.  All other test results, will be communicated via telephone once reviewed by your cardiologist.    If you need a medication refill please contact your pharmacy.  Please allow 3 business days for your refill to be completed.    As always, thank you for trusting us with your health care needs!

## 2022-03-03 NOTE — LETTER
3/3/2022      RE: Keegan Gallo  [id number] [facility] [address]       Dear Colleague,    Thank you for the opportunity to participate in the care of your patient, Keegan Gallo, at the [facility]. Please see a copy of my visit note below.     SUBJECTIVE:  Keegan Gallo is a 46 year old male who presents for establishing cardiac care.  Status post CABG x1, LIMA to LAD.  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  The days he lifts weight he runs 2 to 3 miles.  In no weightlifting he will run 5 to 8 miles.  Lately he had some chest discomfort at around 3 miles of running.  He walks a little bit and then he will be able to continue and finish his run.  Also used to feel weak legs with running.  On 28 June last  year after running at school patient had a syncopal episode.  He was admitted to [facility].  He had mild troponin elevation(high-sensitivity troponin peaked around 260) suggestive of an NSTEMI..  Subsequently patient had a CT coronary angiogram which showed severe proximal LAD stenosis.  Subsequently patient had a coronary angiogram and the team was uncomfortable to proceed with the stent.  So patient had single-vessel CABG using LIMA to LAD on 7/1/2021.  Postop patient had significant bleeding through the chest tubes and has to be reopen.  Since then patient remain asymptomatic.  Patient did have few episodes of atrial fibrillation.  Last episode was when patient was in cardiac rehab.  Patient was sent to the emergency room though on arrival he was in sinus rhythm with a rate of around 55 bpm.  Patient do have intermittent atrial fibrillation.  He was started on amiodarone 200 mg daily and Eliquis.  Currently patient of another 26 days of medications left.  His cardiac risk factors are hyperlipidemia and a strong family  history of premature coronary artery disease.  Father had 5 vessel CABG at age 60.  He is a non-smoker.  No hypertension or diabetes.  Since last visit patient had no palpitations.  From history it appears there is no recurrence of atrial fibrillation.  His main complaint was intermittent dizziness.  This happens when he stands up and try to walk.  According to him if he wiggle his toes and foot for some time and then get up he will do not feel dizzy.  He also do not like taking multiple medications especially Zetia and Lipitor for his hyperlipidemia.    Patient Active Problem List    Diagnosis Date Noted     PAF (paroxysmal atrial fibrillation) (H) 08/02/2021     Priority: Medium     Coronary artery disease involving native heart without angina pectoris, unspecified vessel or lesion type 07/08/2021     Priority: Medium     S/P CABG (coronary artery bypass graft) 07/08/2021     Priority: Medium     Family history of glaucoma in mother 09/11/2017     Priority: Medium     Giant papillary conjunctivitis left eye 08/29/2016     Priority: Medium     High triglycerides 09/22/2015     Priority: Medium     CARDIOVASCULAR SCREENING; LDL GOAL LESS THAN 160 08/26/2015     Priority: Medium     Exotropia, alternating 08/24/2015     Priority: Medium    .  Current Outpatient Medications   Medication Sig     aspirin 81 MG EC tablet Take 81 mg by mouth daily     atorvastatin (LIPITOR) 80 MG tablet Take 1 tablet (80 mg) by mouth daily     Cholecalciferol (VITAMIN D3) 25 MCG (1000 UT) CAPS      metoprolol succinate ER (TOPROL-XL) 25 MG 24 hr tablet Take 1 tablet (25 mg) by mouth daily     Zinc Sulfate (ZINC 15) 66 MG TABS      nitroGLYcerin (NITROSTAT) 0.4 MG sublingual tablet Place 0.4 mg under the tongue     No current facility-administered medications for this visit.     Past Medical History:   Diagnosis Date     Heart disease 6-28-21    Had bypass surgery on 7-1-21     History of blood transfusion 7-1-21    1 unit blood 1 unit  plasma     Strabismus      Past Surgical History:   Procedure Laterality Date     ABDOMEN SURGERY      [place] when i was a baby     ADENOIDECTOMY       AMPUTATION  9-11-98    First joint left digit one of doot (bog toe)     CARDIAC SURGERY  7-1-21    Single bypass surgery     EYE SURGERY       ORTHOPEDIC SURGERY       STRABISMUS SURGERY Bilateral     age 4, 5, one SX one eye and two surgeries other eye     No Known Allergies  Social History     Socioeconomic History     Marital status:      Spouse name: Not on file     Number of children: Not on file     Years of education: Not on file     Highest education level: Not on file   Occupational History     Not on file   Tobacco Use     Smoking status: Never Smoker     Smokeless tobacco: Former User   Substance and Sexual Activity     Alcohol use: Yes     Alcohol/week: 0.0 standard drinks     Comment: rarely     Drug use: No     Sexual activity: Yes     Partners: Female   Other Topics Concern     Parent/sibling w/ CABG, MI or angioplasty before 65F 55M? No   Social History Narrative     Not on file     Social Determinants of Health     Financial Resource Strain: Not on file   Food Insecurity: Not on file   Transportation Needs: Not on file   Physical Activity: Not on file   Stress: Not on file   Social Connections: Not on file   Intimate Partner Violence: Not on file   Housing Stability: Not on file     Family History   Problem Relation Age of Onset     Glaucoma Mother         rx drops     Hypertension Father      Cancer Maternal Aunt      Cancer Maternal Uncle      Cancer Paternal Aunt      Cancer Paternal Uncle      Macular Degeneration Maternal Grandfather         vision loss     Glaucoma Maternal Grandfather         drops     Diabetes No family hx of      Cerebrovascular Disease No family hx of      Thyroid Disease No family hx of         REVIEW OF SYSTEMS:  General: negative, fever, chills, night sweats  Skin: negative, acne, rash and scaling  Eyes:  negative, double vision, eye pain and photophobia  Ears/Nose/Throat: negative, nasal congestion and purulent rhinorrhea  Respiratory: No dyspnea on exertion, No cough, No hemoptysis and negative  Cardiovascular: negative, palpitations, tachycardia, irregular heart beat, chest pain, exertional chest pain or pressure, paroxysmal nocturnal dyspnea, dyspnea on exertion and orthopnea       OBJECTIVE:  Blood pressure 105/63, pulse 56, weight 99.8 kg (220 lb), SpO2 97 %.  General Appearance: healthy, alert, active and no distress  Head: Normocephalic. No masses, lesions, tenderness or abnormalities  Eyes: conjuctiva clear, PERRL, EOM intact  Ears: External ears normal. Canals clear. TM's normal.  Nose: Nares normal  Mouth: normal  Neck: Supple, no cervical adenopathy, no thyromegaly  Lungs: clear to auscultation  Cardiac: regular rate and rhythm, normal S1 and S2, no murmur       ASSESSMENT/PLAN:  Patient here for follow-up.  He had an NSTEMI in July 2021.  Had single-vessel CABG involving LIMA to LAD.  Postop he had significant bleeding which required reopening.  Also had few episodes of atrial fibrillation.  Post discharge also he had few episodes of atrial fibrillation.  He was on amiodarone and apixaban for a short time.  Since last visit he denies any more palpitations.  Patient is feeling well.  Had no complaints today.  His only symptom is he is feeling dizzy when he stands up and try to walk.  This may be due to postural hypotension.  But his blood pressure was low in clinic today.  Will provide patient with a blood pressure apparatus.  Advised to check blood pressure when he feels dizzy.  Patient is taking Zetia 10 mg daily and Lipitor 80 mg daily.  Will discontinue the Zetia.  He has only 1 lipid profile and system that was during his admission with NSTEMI.  This showed an LDL of 207.  We will repeat a lipid profile and adjust the medication.  He is reading too much about the literature of statins and its side  effects.  Recent echocardiogram reviewed.  Normal biventricular function.  No regional wall motion abnormality.  No significant valvular abnormalities.  Patient is advised to continue his current medication and will return to clinic in a year.  Patient will be checking his lipid profile through primary care and will contact clinic through [facility].  Return to Clinic in 1 year.  Total visit duration 30 minutes.  This include face-to-face interview, physical exam, chart review, review of Care Everywhere, review of lipid results, recent echocardiogram and documentation.    SURESH Mojica MD

## 2022-03-03 NOTE — NURSING NOTE
"Chief Complaint   Patient presents with     RECHECK     6 month return NSTEMI. Pt reports lightheadedness. Low BP and pulse.        Initial /63 (BP Location: Right arm, Patient Position: Chair, Cuff Size: Adult Large)   Pulse 56   Wt 99.8 kg (220 lb)   SpO2 97%   BMI 26.78 kg/m   Estimated body mass index is 26.78 kg/m  as calculated from the following:    Height as of 8/2/21: 1.93 m (6' 4\").    Weight as of this encounter: 99.8 kg (220 lb)..  BP completed using cuff size: NAGI Encinas  "

## 2022-03-03 NOTE — PROGRESS NOTES
SUBJECTIVE:  Keegan Gallo is a 46 year old male who presents for establishing cardiac care.  Status post CABG x1, LIMA to LAD.  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  The days he lifts weight he runs 2 to 3 miles.  In no weightlifting he will run 5 to 8 miles.  Lately he had some chest discomfort at around 3 miles of running.  He walks a little bit and then he will be able to continue and finish his run.  Also used to feel weak legs with running.  On 28 June last  year after running at school patient had a syncopal episode.  He was admitted to [facility].  He had mild troponin elevation(high-sensitivity troponin peaked around 260) suggestive of an NSTEMI..  Subsequently patient had a CT coronary angiogram which showed severe proximal LAD stenosis.  Subsequently patient had a coronary angiogram and the team was uncomfortable to proceed with the stent.  So patient had single-vessel CABG using LIMA to LAD on 7/1/2021.  Postop patient had significant bleeding through the chest tubes and has to be reopen.  Since then patient remain asymptomatic.  Patient did have few episodes of atrial fibrillation.  Last episode was when patient was in cardiac rehab.  Patient was sent to the emergency room though on arrival he was in sinus rhythm with a rate of around 55 bpm.  Patient do have intermittent atrial fibrillation.  He was started on amiodarone 200 mg daily and Eliquis.  Currently patient of another 26 days of medications left.  His cardiac risk factors are hyperlipidemia and a strong family history of premature coronary artery disease.  Father had 5 vessel CABG at age 60.  He is a non-smoker.  No hypertension or diabetes.  Since last visit patient had no palpitations.  From history it appears there is no recurrence of atrial fibrillation.  His main complaint was intermittent dizziness.  This happens when he stands up and try to walk.   According to him if he wiggle his toes and foot for some time and then get up he will do not feel dizzy.  He also do not like taking multiple medications especially Zetia and Lipitor for his hyperlipidemia.    Patient Active Problem List    Diagnosis Date Noted     PAF (paroxysmal atrial fibrillation) (H) 08/02/2021     Priority: Medium     Coronary artery disease involving native heart without angina pectoris, unspecified vessel or lesion type 07/08/2021     Priority: Medium     S/P CABG (coronary artery bypass graft) 07/08/2021     Priority: Medium     Family history of glaucoma in mother 09/11/2017     Priority: Medium     Giant papillary conjunctivitis left eye 08/29/2016     Priority: Medium     High triglycerides 09/22/2015     Priority: Medium     CARDIOVASCULAR SCREENING; LDL GOAL LESS THAN 160 08/26/2015     Priority: Medium     Exotropia, alternating 08/24/2015     Priority: Medium    .  Current Outpatient Medications   Medication Sig     aspirin 81 MG EC tablet Take 81 mg by mouth daily     atorvastatin (LIPITOR) 80 MG tablet Take 1 tablet (80 mg) by mouth daily     Cholecalciferol (VITAMIN D3) 25 MCG (1000 UT) CAPS      metoprolol succinate ER (TOPROL-XL) 25 MG 24 hr tablet Take 1 tablet (25 mg) by mouth daily     Zinc Sulfate (ZINC 15) 66 MG TABS      nitroGLYcerin (NITROSTAT) 0.4 MG sublingual tablet Place 0.4 mg under the tongue     No current facility-administered medications for this visit.     Past Medical History:   Diagnosis Date     Heart disease 6-28-21    Had bypass surgery on 7-1-21     History of blood transfusion 7-1-21    1 unit blood 1 unit plasma     Strabismus      Past Surgical History:   Procedure Laterality Date     ABDOMEN SURGERY      [place] when i was a baby     ADENOIDECTOMY       AMPUTATION  9-11-98    First joint left digit one of doot (bog toe)     CARDIAC SURGERY  7-1-21    Single bypass surgery     EYE SURGERY       ORTHOPEDIC SURGERY       STRABISMUS SURGERY Bilateral      age 4, 5, one SX one eye and two surgeries other eye     No Known Allergies  Social History     Socioeconomic History     Marital status:      Spouse name: Not on file     Number of children: Not on file     Years of education: Not on file     Highest education level: Not on file   Occupational History     Not on file   Tobacco Use     Smoking status: Never Smoker     Smokeless tobacco: Former User   Substance and Sexual Activity     Alcohol use: Yes     Alcohol/week: 0.0 standard drinks     Comment: rarely     Drug use: No     Sexual activity: Yes     Partners: Female   Other Topics Concern     Parent/sibling w/ CABG, MI or angioplasty before 65F 55M? No   Social History Narrative     Not on file     Social Determinants of Health     Financial Resource Strain: Not on file   Food Insecurity: Not on file   Transportation Needs: Not on file   Physical Activity: Not on file   Stress: Not on file   Social Connections: Not on file   Intimate Partner Violence: Not on file   Housing Stability: Not on file     Family History   Problem Relation Age of Onset     Glaucoma Mother         rx drops     Hypertension Father      Cancer Maternal Aunt      Cancer Maternal Uncle      Cancer Paternal Aunt      Cancer Paternal Uncle      Macular Degeneration Maternal Grandfather         vision loss     Glaucoma Maternal Grandfather         drops     Diabetes No family hx of      Cerebrovascular Disease No family hx of      Thyroid Disease No family hx of           REVIEW OF SYSTEMS:  General: negative, fever, chills, night sweats  Skin: negative, acne, rash and scaling  Eyes: negative, double vision, eye pain and photophobia  Ears/Nose/Throat: negative, nasal congestion and purulent rhinorrhea  Respiratory: No dyspnea on exertion, No cough, No hemoptysis and negative  Cardiovascular: negative, palpitations, tachycardia, irregular heart beat, chest pain, exertional chest pain or pressure, paroxysmal nocturnal dyspnea, dyspnea on  exertion and orthopnea       OBJECTIVE:  Blood pressure 105/63, pulse 56, weight 99.8 kg (220 lb), SpO2 97 %.  General Appearance: healthy, alert, active and no distress  Head: Normocephalic. No masses, lesions, tenderness or abnormalities  Eyes: conjuctiva clear, PERRL, EOM intact  Ears: External ears normal. Canals clear. TM's normal.  Nose: Nares normal  Mouth: normal  Neck: Supple, no cervical adenopathy, no thyromegaly  Lungs: clear to auscultation  Cardiac: regular rate and rhythm, normal S1 and S2, no murmur       ASSESSMENT/PLAN:  Patient here for follow-up.  He had an NSTEMI in July 2021.  Had single-vessel CABG involving LIMA to LAD.  Postop he had significant bleeding which required reopening.  Also had few episodes of atrial fibrillation.  Post discharge also he had few episodes of atrial fibrillation.  He was on amiodarone and apixaban for a short time.  Since last visit he denies any more palpitations.  Patient is feeling well.  Had no complaints today.  His only symptom is he is feeling dizzy when he stands up and try to walk.  This may be due to postural hypotension.  But his blood pressure was low in clinic today.  Will provide patient with a blood pressure apparatus.  Advised to check blood pressure when he feels dizzy.  Patient is taking Zetia 10 mg daily and Lipitor 80 mg daily.  Will discontinue the Zetia.  He has only 1 lipid profile and system that was during his admission with NSTEMI.  This showed an LDL of 207.  We will repeat a lipid profile and adjust the medication.  He is reading too much about the literature of statins and its side effects.  Recent echocardiogram reviewed.  Normal biventricular function.  No regional wall motion abnormality.  No significant valvular abnormalities.  Patient is advised to continue his current medication and will return to clinic in a year.  Patient will be checking his lipid profile through primary care and will contact clinic through [employer].  Return to  Clinic in 1 year.  Total visit duration 30 minutes.  This include face-to-face interview, physical exam, chart review, review of Care Everywhere, review of lipid results, recent echocardiogram and documentation.

## 2022-03-18 ENCOUNTER — LAB (OUTPATIENT)
Dept: LAB | Facility: CLINIC | Age: 47
End: 2022-03-18
Payer: COMMERCIAL

## 2022-03-18 DIAGNOSIS — Z95.1 S/P CABG (CORONARY ARTERY BYPASS GRAFT): ICD-10-CM

## 2022-03-18 DIAGNOSIS — E78.1 HIGH TRIGLYCERIDES: ICD-10-CM

## 2022-03-18 DIAGNOSIS — I10 HYPERTENSION GOAL BP (BLOOD PRESSURE) < 140/90: ICD-10-CM

## 2022-03-18 LAB
ALBUMIN SERPL-MCNC: 4.3 G/DL (ref 3.4–5)
ALP SERPL-CCNC: 56 U/L (ref 40–150)
ALT SERPL W P-5'-P-CCNC: 44 U/L (ref 0–70)
ANION GAP SERPL CALCULATED.3IONS-SCNC: 7 MMOL/L (ref 3–14)
AST SERPL W P-5'-P-CCNC: 22 U/L (ref 0–45)
BILIRUB SERPL-MCNC: 4.5 MG/DL (ref 0.2–1.3)
BUN SERPL-MCNC: 15 MG/DL (ref 7–30)
CALCIUM SERPL-MCNC: 9.5 MG/DL (ref 8.5–10.1)
CHLORIDE BLD-SCNC: 106 MMOL/L (ref 94–109)
CHOLEST SERPL-MCNC: 129 MG/DL
CO2 SERPL-SCNC: 27 MMOL/L (ref 20–32)
CREAT SERPL-MCNC: 0.97 MG/DL (ref 0.66–1.25)
ERYTHROCYTE [DISTWIDTH] IN BLOOD BY AUTOMATED COUNT: 12.6 % (ref 10–15)
FASTING STATUS PATIENT QL REPORTED: YES
GFR SERPL CREATININE-BSD FRML MDRD: >90 ML/MIN/1.73M2
GLUCOSE BLD-MCNC: 110 MG/DL (ref 70–99)
HCT VFR BLD AUTO: 44.1 % (ref 40–53)
HDLC SERPL-MCNC: 47 MG/DL
HGB BLD-MCNC: 14.9 G/DL (ref 13.3–17.7)
LDLC SERPL CALC-MCNC: 70 MG/DL
MCH RBC QN AUTO: 29.7 PG (ref 26.5–33)
MCHC RBC AUTO-ENTMCNC: 33.8 G/DL (ref 31.5–36.5)
MCV RBC AUTO: 88 FL (ref 78–100)
NONHDLC SERPL-MCNC: 82 MG/DL
PLATELET # BLD AUTO: 249 10E3/UL (ref 150–450)
POTASSIUM BLD-SCNC: 4.5 MMOL/L (ref 3.4–5.3)
PROT SERPL-MCNC: 8.1 G/DL (ref 6.8–8.8)
RBC # BLD AUTO: 5.02 10E6/UL (ref 4.4–5.9)
SODIUM SERPL-SCNC: 140 MMOL/L (ref 133–144)
TRIGL SERPL-MCNC: 61 MG/DL
WBC # BLD AUTO: 7.7 10E3/UL (ref 4–11)

## 2022-03-18 PROCEDURE — 85027 COMPLETE CBC AUTOMATED: CPT

## 2022-03-18 PROCEDURE — 36415 COLL VENOUS BLD VENIPUNCTURE: CPT

## 2022-03-18 PROCEDURE — 80053 COMPREHEN METABOLIC PANEL: CPT

## 2022-03-18 PROCEDURE — 80061 LIPID PANEL: CPT

## 2022-03-24 ENCOUNTER — OFFICE VISIT (OUTPATIENT)
Dept: FAMILY MEDICINE | Facility: CLINIC | Age: 47
End: 2022-03-24
Payer: COMMERCIAL

## 2022-03-24 VITALS
DIASTOLIC BLOOD PRESSURE: 69 MMHG | OXYGEN SATURATION: 99 % | BODY MASS INDEX: 26.51 KG/M2 | HEIGHT: 75 IN | WEIGHT: 213.2 LBS | SYSTOLIC BLOOD PRESSURE: 121 MMHG | HEART RATE: 52 BPM | TEMPERATURE: 97 F

## 2022-03-24 DIAGNOSIS — R17 ELEVATED BILIRUBIN: ICD-10-CM

## 2022-03-24 DIAGNOSIS — I25.10 CORONARY ARTERY DISEASE INVOLVING NATIVE HEART WITHOUT ANGINA PECTORIS, UNSPECIFIED VESSEL OR LESION TYPE: ICD-10-CM

## 2022-03-24 DIAGNOSIS — I48.0 PAF (PAROXYSMAL ATRIAL FIBRILLATION) (H): Primary | ICD-10-CM

## 2022-03-24 DIAGNOSIS — I10 HYPERTENSION GOAL BP (BLOOD PRESSURE) < 140/90: ICD-10-CM

## 2022-03-24 PROCEDURE — 99214 OFFICE O/P EST MOD 30 MIN: CPT | Performed by: FAMILY MEDICINE

## 2022-03-24 RX ORDER — METOPROLOL SUCCINATE 25 MG/1
25 TABLET, EXTENDED RELEASE ORAL DAILY
Qty: 90 TABLET | Refills: 1 | Status: SHIPPED | OUTPATIENT
Start: 2022-03-24 | End: 2022-05-02

## 2022-03-24 ASSESSMENT — PAIN SCALES - GENERAL: PAINLEVEL: NO PAIN (0)

## 2022-03-24 NOTE — PROGRESS NOTES
"SUBJECTIVE:  Keegan Gallo, a 46 year old male scheduled an appointment to discuss the following issues:  Follow-up cad s/p cabg, htn and high cholesterol.  Elevated bilirubin.   No abdominal pain. No nausea, vomiting or diarrhea or constipation. No black/bloody stools. Appetite. No iron pills. Balanced diet. Mild fatigue but restarted baseball - up later. No chest pain or shortness of breath. Cardiology seen - started zetia.   No ALCOHOL regularly.   Needs to monitor blood pressure readings. Water intake could be better. No caffeine.   Medical, social, surgical, and family histories reviewed.    ROS:  All other ROS negative  OBJECTIVE:  /69   Pulse 52   Temp 97  F (36.1  C) (Tympanic)   Ht 1.9 m (6' 2.8\")   Wt 96.7 kg (213 lb 3.2 oz)   SpO2 99%   BMI 26.79 kg/m    EXAM:  GENERAL APPEARANCE: healthy, alert and no distress  EYES: EOMI,  PERRL, no scleral icterus   HENT: ear canals and TM's normal and nose and mouth without ulcers or lesions  NECK: no adenopathy, no asymmetry, masses, or scars and thyroid normal to palpation  RESP: lungs clear to auscultation - no rales, rhonchi or wheezes  CV: regular rates and rhythm, normal S1 S2, no S3 or S4 and no murmur, click or rub -  ABDOMEN:  soft, nontender, no HSM or masses and bowel sounds normal  MS: extremities normal- no gross deformities noted, no evidence of inflammation in joints, FROM in all extremities.  SKIN: no suspicious lesions or rashes  NEURO: Normal strength and tone, sensory exam grossly normal, mentation intact and speech normal  PSYCH: mentation appears normal and affect normal/bright    ASSESSMENT / PLAN:  (R17) Elevated bilirubin  (primary encounter diagnosis)  Comment: gilberts vs ? Consider med related or GI help  Plan: US Abdomen Limited        Check ultarsound. Repeat labs if normal ultrasound in one month. Expected course and warning signs reviewed. Call/email with questions/concerns     (I10) Hypertension goal BP (blood " pressure) < 140/90  Comment: stable  Plan: metoprolol succinate ER (TOPROL-XL) 25 MG 24 hr        tablet        Self-monitor. Chest pain or shortness of breath to er.     (I48.0) PAF (paroxysmal atrial fibrillation) (H)  Comment: stable  Plan: per cardiology    (I25.10) Coronary artery disease involving native heart without angina pectoris, unspecified vessel or lesion type  Comment: stable  Plan: per cardiology. Continue exercise. Chest pain or shortness of breath to er.     Surya Kemp MD

## 2022-03-25 NOTE — PROGRESS NOTES
Left message on patient's voicemail to call the Imaging Scheduling number to set up an U/S.Helene Espino MA/PRASHANT

## 2022-04-04 ENCOUNTER — ANCILLARY PROCEDURE (OUTPATIENT)
Dept: ULTRASOUND IMAGING | Facility: CLINIC | Age: 47
End: 2022-04-04
Attending: FAMILY MEDICINE
Payer: COMMERCIAL

## 2022-04-04 DIAGNOSIS — R17 ELEVATED BILIRUBIN: ICD-10-CM

## 2022-04-04 PROCEDURE — 76705 ECHO EXAM OF ABDOMEN: CPT | Performed by: RADIOLOGY

## 2022-04-05 ENCOUNTER — TELEPHONE (OUTPATIENT)
Dept: FAMILY MEDICINE | Facility: CLINIC | Age: 47
End: 2022-04-05
Payer: COMMERCIAL

## 2022-04-05 NOTE — TELEPHONE ENCOUNTER
Patient called back, I informed him of Dr Kemp's message, and set up a lab appointment 5.3.22.Hleene Espino MA/PRASHANT

## 2022-04-05 NOTE — TELEPHONE ENCOUNTER
Sruya Kemp MD  [facility]  Please call patient. Normal ultrasound. Let's repeat a non-fasting lab in one month - please help set-up.     Patient:   Keegan Gallo   [phone] (home) [phone] (work)     Provider:   Surya Kemp MD MD   Please call/evisit with questions or concerns.       Left message for patient to call back.Helene Espino MA/TC

## 2022-04-28 ENCOUNTER — MYC MEDICAL ADVICE (OUTPATIENT)
Dept: FAMILY MEDICINE | Facility: CLINIC | Age: 47
End: 2022-04-28
Payer: COMMERCIAL

## 2022-05-03 ENCOUNTER — LAB (OUTPATIENT)
Dept: LAB | Facility: CLINIC | Age: 47
End: 2022-05-03
Payer: COMMERCIAL

## 2022-05-03 DIAGNOSIS — R17 ELEVATED BILIRUBIN: ICD-10-CM

## 2022-05-03 LAB
ALBUMIN SERPL-MCNC: 4.1 G/DL (ref 3.4–5)
ALP SERPL-CCNC: 66 U/L (ref 40–150)
ALT SERPL W P-5'-P-CCNC: 36 U/L (ref 0–70)
AST SERPL W P-5'-P-CCNC: 15 U/L (ref 0–45)
BILIRUB DIRECT SERPL-MCNC: 0.3 MG/DL (ref 0–0.2)
BILIRUB SERPL-MCNC: 2.8 MG/DL (ref 0.2–1.3)
PROT SERPL-MCNC: 7.7 G/DL (ref 6.8–8.8)

## 2022-05-03 PROCEDURE — 36415 COLL VENOUS BLD VENIPUNCTURE: CPT

## 2022-05-03 PROCEDURE — 80076 HEPATIC FUNCTION PANEL: CPT

## 2022-06-03 DIAGNOSIS — E78.1 HIGH TRIGLYCERIDES: ICD-10-CM

## 2022-06-03 RX ORDER — ATORVASTATIN CALCIUM 80 MG/1
80 TABLET, FILM COATED ORAL DAILY
Qty: 90 TABLET | Refills: 0 | Status: SHIPPED | OUTPATIENT
Start: 2022-06-03 | End: 2022-10-05

## 2022-08-15 ENCOUNTER — OFFICE VISIT (OUTPATIENT)
Dept: OPTOMETRY | Facility: CLINIC | Age: 47
End: 2022-08-15
Payer: COMMERCIAL

## 2022-08-15 DIAGNOSIS — H52.223 REGULAR ASTIGMATISM OF BOTH EYES: ICD-10-CM

## 2022-08-15 DIAGNOSIS — H52.4 PRESBYOPIA: ICD-10-CM

## 2022-08-15 DIAGNOSIS — H52.13 MYOPIA OF BOTH EYES: ICD-10-CM

## 2022-08-15 DIAGNOSIS — Z01.00 ROUTINE EYE EXAM: Primary | ICD-10-CM

## 2022-08-15 DIAGNOSIS — H50.15 EXOTROPIA, ALTERNATING: ICD-10-CM

## 2022-08-15 PROCEDURE — 92015 DETERMINE REFRACTIVE STATE: CPT | Performed by: OPTOMETRIST

## 2022-08-15 PROCEDURE — 92310 CONTACT LENS FITTING OU: CPT | Mod: GA | Performed by: OPTOMETRIST

## 2022-08-15 PROCEDURE — 92014 COMPRE OPH EXAM EST PT 1/>: CPT | Performed by: OPTOMETRIST

## 2022-08-15 RX ORDER — RIBOFLAVIN (VITAMIN B2) 100 MG
100 TABLET ORAL 3 TIMES DAILY
COMMUNITY

## 2022-08-15 ASSESSMENT — KERATOMETRY
OD_AXISANGLE2_DEGREES: 17
OS_K1POWER_DIOPTERS: 48.00
OD_K2POWER_DIOPTERS: 46.75
OS_K2POWER_DIOPTERS: 47.25
OD_K1POWER_DIOPTERS: 48.00
OS_AXISANGLE2_DEGREES: 179

## 2022-08-15 ASSESSMENT — REFRACTION_MANIFEST
OS_CYLINDER: -0.75
OS_AXIS: 080
OS_SPHERE: -2.00
OD_SPHERE: -4.25
OS_CYLINDER: +1.25
OD_SPHERE: -2.00
OD_AXIS: 013
OS_ADD: +1.00
OD_AXIS: 120
OD_ADD: +1.00
OD_CYLINDER: -1.75
OS_SPHERE: -3.50
OS_AXIS: 177
METHOD_AUTOREFRACTION: 1
OD_CYLINDER: +2.00

## 2022-08-15 ASSESSMENT — REFRACTION_CURRENTRX
OS_BASECURVE: 8.6
OD_BRAND: ALCON AIR OPTIX AQUA FOR ASTIGMATISM
OD_CYLINDER: -0.75
OD_AXIS: 110
OD_DIAMETER: 14.3
OS_SPHERE: -2.50
OD_BASECURVE: 8.7
OD_BASECURVE: 8.6
OS_SPHERE: -2.50
OS_BRAND: COOPER PROCLEAR 1 DAY
OS_BASECURVE: 8.7
OD_AXIS: 120
OS_DIAMETER: 14.1
OS_DIAMETER: 14.2
OS_BRAND: COOPER CLARITI 1 DAY 8.6 14.1
OD_BRAND: COOPER CLARITI 1 DAY TORIC
OD_DIAMETER: 14.5
OD_CYLINDER: -1.75
OD_SPHERE: -2.00
OD_SPHERE: -2.75

## 2022-08-15 ASSESSMENT — CONF VISUAL FIELD
METHOD: COUNTING FINGERS
OD_NORMAL: 1
OS_NORMAL: 1

## 2022-08-15 ASSESSMENT — REFRACTION_WEARINGRX
OD_AXIS: 025
OS_CYLINDER: +0.75
OS_AXIS: 172
OD_SPHERE: -3.75
OS_SPHERE: -2.75
SPECS_TYPE: SVL
OD_CYLINDER: +1.50

## 2022-08-15 ASSESSMENT — VISUAL ACUITY
OD_PH_CC: 20/25
OD_CC: 20/40
OS_CC: 20/40-2
OD_CC+: -1
METHOD: SNELLEN - LINEAR
CORRECTION_TYPE: CONTACTS
OD_CC: 20/40-3
OS_CC: 20/20

## 2022-08-15 ASSESSMENT — EXTERNAL EXAM - LEFT EYE: OS_EXAM: NORMAL

## 2022-08-15 ASSESSMENT — TONOMETRY
OS_IOP_MMHG: 10
OD_IOP_MMHG: 10
IOP_METHOD: APPLANATION

## 2022-08-15 ASSESSMENT — CUP TO DISC RATIO
OD_RATIO: 0.55
OS_RATIO: 0.65

## 2022-08-15 ASSESSMENT — EXTERNAL EXAM - RIGHT EYE: OD_EXAM: NORMAL

## 2022-08-15 NOTE — PROGRESS NOTES
"Chief Complaint   Patient presents with     COMPREHENSIVE EYE EXAM     Contact Lens Re-fitting           Previous contact lens wearer? Yes: wearing lenses, monthly in right eye, Daily in left eye. Interested in switching everything to a daily   Comfort of contact lenses : Good, but today the left one is feeling \"off\"   Satisfied with current lenses: yes, but interested in Dailies for Both Eyes     Likes to wear contact lenses more than glasses     Last Eye Exam: 11/25/2019  Dilated Previously: Yes    What are you currently using to see?  glasses and contacts    Distance Vision Acuity: Satisfied with vision, no changes noted with the glasses, CL Rx needs to be updated     Near Vision Acuity: Not satisfied with the contacts, otherwise looks under or removes his glasses to read      Eye Comfort: good  Do you use eye drops? : No  Occupation or Hobbies: Teacher       Tracy Ly Optometric Assistant      Medical, surgical and family histories reviewed and updated 8/15/2022.       OBJECTIVE: See Ophthalmology exam    ASSESSMENT:    ICD-10-CM    1. Routine eye exam  Z01.00 EYE EXAM (SIMPLE-NONBILLABLE)     REFRACTION     CONTACT LENS FITTING,BILAT w/ signed waiver   2. Myopia of both eyes  H52.13 EYE EXAM (SIMPLE-NONBILLABLE)     REFRACTION     CONTACT LENS FITTING,BILAT w/ signed waiver   3. Regular astigmatism of both eyes  H52.223 EYE EXAM (SIMPLE-NONBILLABLE)     REFRACTION     CONTACT LENS FITTING,BILAT w/ signed waiver   4. Presbyopia  H52.4 EYE EXAM (SIMPLE-NONBILLABLE)     REFRACTION     CONTACT LENS FITTING,BILAT w/ signed waiver   5. Exotropia, alternating  H50.15 EYE EXAM (SIMPLE-NONBILLABLE)     REFRACTION     CONTACT LENS FITTING,BILAT w/ signed waiver      PLAN:     Patient Instructions   Optional to fill new glasses prescription, minimal change  Contacts on order.  We will notify you to when contacts are ready to be picked up.  Wear contact lenses to contact lenses check appointment 1-2 weeks later.  Use " +1.00 or +1.25 over the counter reader with contact lenses     Lilliana Alejandro, OD  [phone]

## 2022-08-15 NOTE — PATIENT INSTRUCTIONS
Optional to fill new glasses prescription, minimal change  Contacts on order.  We will notify you to when contacts are ready to be picked up.  Wear contact lenses to contact lenses check appointment 1-2 weeks later.  Use +1.00 or +1.25 over the counter reader with contact lenses     Lilliana Alejandro, OD  [phone]

## 2022-08-15 NOTE — LETTER
"    8/15/2022         RE: Keegan Gallo  [address] [facility] [address]        Dear Colleague,    Thank you for referring your patient, Keegan Gallo, to the [facility]. Please see a copy of my visit note below.    Chief Complaint   Patient presents with     COMPREHENSIVE EYE EXAM     Contact Lens Re-fitting           Previous contact lens wearer? Yes: wearing lenses, monthly in right eye, Daily in left eye. Interested in switching everything to a daily   Comfort of contact lenses : Good, but today the left one is feeling \"off\"   Satisfied with current lenses: yes, but interested in Dailies for Both Eyes     Likes to wear contact lenses more than glasses     Last Eye Exam: 11/25/2019  Dilated Previously: Yes    What are you currently using to see?  glasses and contacts    Distance Vision Acuity: Satisfied with vision, no changes noted with the glasses, CL Rx needs to be updated     Near Vision Acuity: Not satisfied with the contacts, otherwise looks under or removes his glasses to read      Eye Comfort: good  Do you use eye drops? : No  Occupation or Hobbies: Teacher       Tracy Ly Optometric Assistant      Medical, surgical and family histories reviewed and updated 8/15/2022.       OBJECTIVE: See Ophthalmology exam    ASSESSMENT:    ICD-10-CM    1. Routine eye exam  Z01.00 EYE EXAM (SIMPLE-NONBILLABLE)     REFRACTION     CONTACT LENS FITTING,BILAT w/ signed waiver   2. Myopia of both eyes  H52.13 EYE EXAM (SIMPLE-NONBILLABLE)     REFRACTION     CONTACT LENS FITTING,BILAT w/ signed waiver   3. Regular astigmatism of both eyes  H52.223 EYE EXAM (SIMPLE-NONBILLABLE)     REFRACTION     CONTACT LENS FITTING,BILAT w/ signed waiver   4. Presbyopia  H52.4 EYE EXAM (SIMPLE-NONBILLABLE)     REFRACTION     CONTACT LENS FITTING,BILAT w/ signed waiver   5. Exotropia, alternating  H50.15 EYE EXAM (SIMPLE-NONBILLABLE)     REFRACTION     CONTACT LENS FITTING,BILAT w/ signed waiver    "   PLAN:     Patient Instructions   Optional to fill new glasses prescription, minimal change  Contacts on order.  We will notify you to when contacts are ready to be picked up.  Wear contact lenses to contact lenses check appointment 1-2 weeks later.  Use +1.00 or +1.25 over the counter reader with contact lenses     Lilliana Alejandro, OD  [phone]                                        Again, thank you for allowing me to participate in the care of your patient.        Sincerely,        Lilliana Alejandro, OD

## 2022-08-16 ENCOUNTER — TELEPHONE (OUTPATIENT)
Dept: OPTOMETRY | Facility: CLINIC | Age: 47
End: 2022-08-16

## 2022-08-16 NOTE — TELEPHONE ENCOUNTER
8/16/2022     BRAND BC PATRICIA POWER ADD QTY   OD Clariti 1 Day Toric 8.6 14.3 -2.00 -1.75 x120     OS Clariti 1 Day  8.6 14.1 -2.50           REF:    Clinic visit    Payment Type: no charge trials        Tracy Apple Optometric Assistant       CL IN (date, intials):     AML      Patient Notified (date, initals):  8/22/22 AML      Dispensed by: AML 8/22/22

## 2022-08-31 ENCOUNTER — APPOINTMENT (OUTPATIENT)
Dept: OPTOMETRY | Facility: CLINIC | Age: 47
End: 2022-08-31
Payer: COMMERCIAL

## 2022-08-31 PROCEDURE — 92340 FIT SPECTACLES MONOFOCAL: CPT | Performed by: OPTOMETRIST

## 2022-09-19 ENCOUNTER — OFFICE VISIT (OUTPATIENT)
Dept: OPTOMETRY | Facility: CLINIC | Age: 47
End: 2022-09-19
Payer: COMMERCIAL

## 2022-09-19 DIAGNOSIS — H52.4 PRESBYOPIA: ICD-10-CM

## 2022-09-19 DIAGNOSIS — H52.223 REGULAR ASTIGMATISM OF BOTH EYES: ICD-10-CM

## 2022-09-19 DIAGNOSIS — H52.13 MYOPIA OF BOTH EYES: Primary | ICD-10-CM

## 2022-09-19 PROCEDURE — 99207 PR NO CHARGE LOS: CPT | Performed by: OPTOMETRIST

## 2022-09-19 ASSESSMENT — VISUAL ACUITY
OD_CC: 20/20
OD_CC: 20/30
OS_CC: 20/20-2
OS_CC+: -2
OD_CC: 20/40
METHOD: SNELLEN - LINEAR
CORRECTION_TYPE: GLASSES, CONTACTS
CORRECTION_TYPE: CONTACTS
OS_CC: 20/40
OS_CC: 20/30

## 2022-09-19 ASSESSMENT — REFRACTION_CURRENTRX
OD_DIAMETER: 14.3
OS_DIAMETER: 14.1
OD_CYLINDER: -1.75
OS_BASECURVE: 8.6
OD_BRAND: COOPER CLARITI 1 DAY TORIC
OD_SPHERE: -2.00
OD_BASECURVE: 8.6
OS_BRAND: COOPER CLARITI 1 DAY 8.6 14.1
OS_SPHERE: -2.50
OD_AXIS: 120

## 2022-09-19 NOTE — PATIENT INSTRUCTIONS
Contact lenses prescription released to patient today.  Return to clinic 1 year for eye exam.    Lilliana Alejandro, OD  [phone]

## 2022-09-19 NOTE — LETTER
9/19/2022         RE: Keegan Gallo  [address] [facility] [address]        Dear Colleague,    Thank you for referring your patient, Keegan Gallo, to the [facility]. Please see a copy of my visit note below.    Chief Complaint   Patient presents with     Contact Lens Check     Satisfied with contacts:  Yes, Wearing a Daily Lens     Good comfort:  Yes  Clear vision:     Yes    Tracy Ly Optometric Assistant           Medical, surgical and family histories reviewed and updated 9/19/2022.       OBJECTIVE: See Ophthalmology exam    ASSESSMENT:    ICD-10-CM    1. Myopia of both eyes  H52.13    2. Regular astigmatism of both eyes  H52.223    3. Presbyopia  H52.4       PLAN:    Patient Instructions   Contact lenses prescription released to patient today.  Return to clinic 1 year for eye exam.    Lilliana Alejandro, OD  [phone]                                          Again, thank you for allowing me to participate in the care of your patient.        Sincerely,        Lilliana Alejandro, OD

## 2022-09-19 NOTE — PROGRESS NOTES
Chief Complaint   Patient presents with     Contact Lens Check     Satisfied with contacts:  Yes, Wearing a Daily Lens     Good comfort:  Yes  Clear vision:     Yes    Tracy Ly Optometric Assistant           Medical, surgical and family histories reviewed and updated 9/19/2022.       OBJECTIVE: See Ophthalmology exam    ASSESSMENT:    ICD-10-CM    1. Myopia of both eyes  H52.13    2. Regular astigmatism of both eyes  H52.223    3. Presbyopia  H52.4       PLAN:    Patient Instructions   Contact lenses prescription released to patient today.  Return to clinic 1 year for eye exam.    Lilliana Alejandro, OD  [phone]

## 2022-10-09 ENCOUNTER — HEALTH MAINTENANCE LETTER (OUTPATIENT)
Age: 47
End: 2022-10-09

## 2022-12-06 ENCOUNTER — TELEPHONE (OUTPATIENT)
Dept: CARDIOLOGY | Facility: CLINIC | Age: 47
End: 2022-12-06

## 2023-02-18 ENCOUNTER — HEALTH MAINTENANCE LETTER (OUTPATIENT)
Age: 48
End: 2023-02-18

## 2023-03-09 ENCOUNTER — OFFICE VISIT (OUTPATIENT)
Dept: CARDIOLOGY | Facility: CLINIC | Age: 48
End: 2023-03-09
Payer: COMMERCIAL

## 2023-03-09 ENCOUNTER — TELEPHONE (OUTPATIENT)
Dept: FAMILY MEDICINE | Facility: CLINIC | Age: 48
End: 2023-03-09

## 2023-03-09 VITALS
HEART RATE: 65 BPM | DIASTOLIC BLOOD PRESSURE: 76 MMHG | BODY MASS INDEX: 27.01 KG/M2 | OXYGEN SATURATION: 99 % | WEIGHT: 215 LBS | SYSTOLIC BLOOD PRESSURE: 114 MMHG

## 2023-03-09 DIAGNOSIS — I21.4 NSTEMI (NON-ST ELEVATED MYOCARDIAL INFARCTION) (H): Primary | ICD-10-CM

## 2023-03-09 DIAGNOSIS — Z95.1 STATUS POST AORTO-CORONARY ARTERY BYPASS GRAFT: ICD-10-CM

## 2023-03-09 PROCEDURE — 99214 OFFICE O/P EST MOD 30 MIN: CPT | Performed by: INTERNAL MEDICINE

## 2023-03-09 RX ORDER — ATORVASTATIN CALCIUM 40 MG/1
40 TABLET, FILM COATED ORAL DAILY
Qty: 90 TABLET | Refills: 3 | Status: SHIPPED | OUTPATIENT
Start: 2023-03-09

## 2023-03-09 NOTE — LETTER
3/9/2023      RE: Keegan Gallo  [address] [facility] [address]       Dear Colleague,    Thank you for the opportunity to participate in the care of your patient, Keegan Gallo, at [facility]. Please see a copy of my visit note below.       SUBJECTIVE:  Keegan Gallo is a 47 year old male who presents for yearly follow-up.  Status post CABG x1, LIMA to LAD on 7/1/2021.    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  The days he lifts weight he runs 2 to 3 miles.  In no weightlifting he will run 5 to 8 miles.  Lately he had some chest discomfort at around 3 miles of running.  He walks a little bit and then he will be able to continue and finish his run.  Also used to feel weak legs with running.  On 28 June last  year after running at school patient had a syncopal episode.  He was admitted to [facility].  He had mild troponin elevation(high-sensitivity troponin peaked around 260) suggestive of an NSTEMI..  Subsequently patient had a CT coronary angiogram which showed severe proximal LAD stenosis.  Subsequently patient had a coronary angiogram and the team was uncomfortable to proceed with the stent.  So patient had single-vessel CABG using LIMA to LAD on 7/1/2021.  Postop patient had significant bleeding through the chest tubes and has to be reopen.  Since then patient remain asymptomatic.  Patient did have few episodes of atrial fibrillation.  Last episode was when patient was in cardiac rehab.  Patient was sent to the emergency room though on arrival he was in sinus rhythm with a rate of around 55 bpm.  Patient do have intermittent atrial fibrillation.  He was started on amiodarone 200 mg daily and Eliquis.  Currently patient of another 26 days of medications left.  His cardiac risk factors are hyperlipidemia and a strong  family history of premature coronary artery disease.  Father had 5 vessel CABG at age 60.  He is a non-smoker.  No hypertension or diabetes.   Currently patient is very active with no cardiac complaints.  Overall he is doing very well.  Patient Active Problem List    Diagnosis Date Noted     PAF (paroxysmal atrial fibrillation) (H) 08/02/2021     Priority: Medium     Coronary artery disease involving native heart without angina pectoris, unspecified vessel or lesion type 07/08/2021     Priority: Medium     S/P CABG (coronary artery bypass graft) 07/08/2021     Priority: Medium     Family history of glaucoma in mother 09/11/2017     Priority: Medium     Giant papillary conjunctivitis left eye 08/29/2016     Priority: Medium     High triglycerides 09/22/2015     Priority: Medium     CARDIOVASCULAR SCREENING; LDL GOAL LESS THAN 160 08/26/2015     Priority: Medium     Exotropia, alternating 08/24/2015     Priority: Medium    .  Current Outpatient Medications   Medication Sig     aspirin 81 MG EC tablet Take 81 mg by mouth daily     atorvastatin (LIPITOR) 40 MG tablet Take 1 tablet (40 mg) by mouth daily     Blood Pressure Monitor KIT 1 kit daily     Cholecalciferol (VITAMIN D3) 25 MCG (1000 UT) CAPS      vitamin C (ASCORBIC ACID) 100 MG tablet Take 100 mg by mouth 3 times daily     Zinc Sulfate (ZINC 15) 66 MG TABS      nitroGLYcerin (NITROSTAT) 0.4 MG sublingual tablet Place 0.4 mg under the tongue (Patient not taking: Reported on 3/24/2022)     No current facility-administered medications for this visit.     Past Medical History:   Diagnosis Date     Heart disease 6-28-21    Had bypass surgery on 7-1-21     History of blood transfusion 7-1-21    1 unit blood 1 unit plasma     Strabismus      Past Surgical History:   Procedure Laterality Date     ABDOMEN SURGERY      [place] when i was a baby     ADENOIDECTOMY       AMPUTATION  9-11-98    First joint left digit one of doot (bog toe)     CARDIAC SURGERY  7-1-21     Single bypass surgery     EYE SURGERY       ORTHOPEDIC SURGERY       STRABISMUS SURGERY Bilateral     age 4, 5, one SX one eye and two surgeries other eye     No Known Allergies  Social History     Socioeconomic History     Marital status:      Spouse name: Not on file     Number of children: Not on file     Years of education: Not on file     Highest education level: Not on file   Occupational History     Not on file   Tobacco Use     Smoking status: Never     Smokeless tobacco: Former     Quit date: 8/1/2000   Vaping Use     Vaping Use: Never used   Substance and Sexual Activity     Alcohol use: Yes     Alcohol/week: 0.0 standard drinks     Comment: rarely     Drug use: No     Sexual activity: Yes     Partners: Female   Other Topics Concern     Parent/sibling w/ CABG, MI or angioplasty before 65F 55M? No   Social History Narrative     Not on file     Social Determinants of Health     Financial Resource Strain: Not on file   Food Insecurity: Not on file   Transportation Needs: Not on file   Physical Activity: Not on file   Stress: Not on file   Social Connections: Not on file   Intimate Partner Violence: Not on file   Housing Stability: Not on file     Family History   Problem Relation Age of Onset     Glaucoma Mother         rx drops     Hypertension Father      Cancer Maternal Aunt      Cancer Maternal Uncle      Cancer Paternal Aunt      Cancer Paternal Uncle      Macular Degeneration Maternal Grandfather         vision loss     Glaucoma Maternal Grandfather         drops     Diabetes No family hx of      Cerebrovascular Disease No family hx of      Thyroid Disease No family hx of           REVIEW OF SYSTEMS:  General: negative, fever, chills, night sweats  Skin: negative, acne, rash and scaling  Eyes: negative, double vision, eye pain and photophobia  Ears/Nose/Throat: negative, nasal congestion and purulent rhinorrhea  Respiratory: No dyspnea on exertion, No cough, No hemoptysis and  negative  Cardiovascular: negative, palpitations, tachycardia, irregular heart beat, chest pain, exertional chest pain or pressure, paroxysmal nocturnal dyspnea, dyspnea on exertion and orthopnea       OBJECTIVE:  Blood pressure 114/76, pulse 65, weight 97.5 kg (215 lb), SpO2 99 %.  General Appearance: healthy, alert, active and no distress  Head: Normocephalic. No masses, lesions, tenderness or abnormalities  Eyes: conjuctiva clear, PERRL, EOM intact  Ears: External ears normal. Canals clear. TM's normal.  Nose: Nares normal  Mouth: normal  Neck: Supple, no cervical adenopathy, no thyromegaly  Lungs: clear to auscultation  Cardiac: regular rate and rhythm, normal S1 and S2, no murmur       ASSESSMENT/PLAN:  Patient here for yearly follow-up.  Status post CABG x1 using LIMA to LAD on 7/1/2021 for an NSTEMI with peak highly sensitive troponin of 260.  Postop patient had significant bleeding and few episodes of atrial fibrillation.  He had a monitor after discharge which showed A-fib burden of 2%.  Once he had atrial fibrillation during rehab and he was sent to the emergency room.  By the time he reached the emergency room he was in sinus rhythm.  Since then he had no recurrence of atrial fibrillation.  Currently patient is very active with no cardiac symptoms.  His last echocardiogram in February 2022 was completely normal with normal function and no regional wall motion abnormality.  No significant valvular abnormalities.  Patient's current cardiac medications are atorvastatin 80 mg daily and aspirin 81 mg daily.  Patient states that he did some research and high-dose of statin can cause memory impairment and other side effects.  He would like to cut it down to 40 mg a day.  We will cut down atorvastatin to 40 mg daily.  Patient will return to clinic in 1 year with a repeat lipid profile and LFTs.  Meanwhile he is advised to continue Lipitor 40 mg daily and aspirin at low-dose.  Total visit duration 30 minutes.   This included face-to-face interview, physical exam, chart review, review of echocardiogram and documentation.      Please do not hesitate to contact me if you have any questions/concerns.     Sincerely,     SURESH Mojica MD

## 2023-03-09 NOTE — PROGRESS NOTES
SUBJECTIVE:  Keegan Gallo is a 47 year old male who presents for yearly follow-up.  Status post CABG x1, LIMA to LAD on 7/1/2021.    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  The days he lifts weight he runs 2 to 3 miles.  In no weightlifting he will run 5 to 8 miles.  Lately he had some chest discomfort at around 3 miles of running.  He walks a little bit and then he will be able to continue and finish his run.  Also used to feel weak legs with running.  On 28 June last  year after running at school patient had a syncopal episode.  He was admitted to [facility].  He had mild troponin elevation(high-sensitivity troponin peaked around 260) suggestive of an NSTEMI..  Subsequently patient had a CT coronary angiogram which showed severe proximal LAD stenosis.  Subsequently patient had a coronary angiogram and the team was uncomfortable to proceed with the stent.  So patient had single-vessel CABG using LIMA to LAD on 7/1/2021.  Postop patient had significant bleeding through the chest tubes and has to be reopen.  Since then patient remain asymptomatic.  Patient did have few episodes of atrial fibrillation.  Last episode was when patient was in cardiac rehab.  Patient was sent to the emergency room though on arrival he was in sinus rhythm with a rate of around 55 bpm.  Patient do have intermittent atrial fibrillation.  He was started on amiodarone 200 mg daily and Eliquis.  Currently patient of another 26 days of medications left.  His cardiac risk factors are hyperlipidemia and a strong family history of premature coronary artery disease.  Father had 5 vessel CABG at age 60.  He is a non-smoker.  No hypertension or diabetes.   Currently patient is very active with no cardiac complaints.  Overall he is doing very well.  Patient Active Problem List    Diagnosis Date Noted     PAF (paroxysmal atrial fibrillation) (H) 08/02/2021      Priority: Medium     Coronary artery disease involving native heart without angina pectoris, unspecified vessel or lesion type 07/08/2021     Priority: Medium     S/P CABG (coronary artery bypass graft) 07/08/2021     Priority: Medium     Family history of glaucoma in mother 09/11/2017     Priority: Medium     Giant papillary conjunctivitis left eye 08/29/2016     Priority: Medium     High triglycerides 09/22/2015     Priority: Medium     CARDIOVASCULAR SCREENING; LDL GOAL LESS THAN 160 08/26/2015     Priority: Medium     Exotropia, alternating 08/24/2015     Priority: Medium    .  Current Outpatient Medications   Medication Sig     aspirin 81 MG EC tablet Take 81 mg by mouth daily     atorvastatin (LIPITOR) 40 MG tablet Take 1 tablet (40 mg) by mouth daily     Blood Pressure Monitor KIT 1 kit daily     Cholecalciferol (VITAMIN D3) 25 MCG (1000 UT) CAPS      vitamin C (ASCORBIC ACID) 100 MG tablet Take 100 mg by mouth 3 times daily     Zinc Sulfate (ZINC 15) 66 MG TABS      nitroGLYcerin (NITROSTAT) 0.4 MG sublingual tablet Place 0.4 mg under the tongue (Patient not taking: Reported on 3/24/2022)     No current facility-administered medications for this visit.     Past Medical History:   Diagnosis Date     Heart disease 6-28-21    Had bypass surgery on 7-1-21     History of blood transfusion 7-1-21    1 unit blood 1 unit plasma     Strabismus      Past Surgical History:   Procedure Laterality Date     ABDOMEN SURGERY      [place] when i was a baby     ADENOIDECTOMY       AMPUTATION  9-11-98    First joint left digit one of doot (bog toe)     CARDIAC SURGERY  7-1-21    Single bypass surgery     EYE SURGERY       ORTHOPEDIC SURGERY       STRABISMUS SURGERY Bilateral     age 4, 5, one SX one eye and two surgeries other eye     No Known Allergies  Social History     Socioeconomic History     Marital status:      Spouse name: Not on file     Number of children: Not on file     Years of education: Not on file      Highest education level: Not on file   Occupational History     Not on file   Tobacco Use     Smoking status: Never     Smokeless tobacco: Former     Quit date: 8/1/2000   Vaping Use     Vaping Use: Never used   Substance and Sexual Activity     Alcohol use: Yes     Alcohol/week: 0.0 standard drinks     Comment: rarely     Drug use: No     Sexual activity: Yes     Partners: Female   Other Topics Concern     Parent/sibling w/ CABG, MI or angioplasty before 65F 55M? No   Social History Narrative     Not on file     Social Determinants of Health     Financial Resource Strain: Not on file   Food Insecurity: Not on file   Transportation Needs: Not on file   Physical Activity: Not on file   Stress: Not on file   Social Connections: Not on file   Intimate Partner Violence: Not on file   Housing Stability: Not on file     Family History   Problem Relation Age of Onset     Glaucoma Mother         rx drops     Hypertension Father      Cancer Maternal Aunt      Cancer Maternal Uncle      Cancer Paternal Aunt      Cancer Paternal Uncle      Macular Degeneration Maternal Grandfather         vision loss     Glaucoma Maternal Grandfather         drops     Diabetes No family hx of      Cerebrovascular Disease No family hx of      Thyroid Disease No family hx of           REVIEW OF SYSTEMS:  General: negative, fever, chills, night sweats  Skin: negative, acne, rash and scaling  Eyes: negative, double vision, eye pain and photophobia  Ears/Nose/Throat: negative, nasal congestion and purulent rhinorrhea  Respiratory: No dyspnea on exertion, No cough, No hemoptysis and negative  Cardiovascular: negative, palpitations, tachycardia, irregular heart beat, chest pain, exertional chest pain or pressure, paroxysmal nocturnal dyspnea, dyspnea on exertion and orthopnea       OBJECTIVE:  Blood pressure 114/76, pulse 65, weight 97.5 kg (215 lb), SpO2 99 %.  General Appearance: healthy, alert, active and no distress  Head: Normocephalic. No  masses, lesions, tenderness or abnormalities  Eyes: conjuctiva clear, PERRL, EOM intact  Ears: External ears normal. Canals clear. TM's normal.  Nose: Nares normal  Mouth: normal  Neck: Supple, no cervical adenopathy, no thyromegaly  Lungs: clear to auscultation  Cardiac: regular rate and rhythm, normal S1 and S2, no murmur       ASSESSMENT/PLAN:  Patient here for yearly follow-up.  Status post CABG x1 using LIMA to LAD on 7/1/2021 for an NSTEMI with peak highly sensitive troponin of 260.  Postop patient had significant bleeding and few episodes of atrial fibrillation.  He had a monitor after discharge which showed A-fib burden of 2%.  Once he had atrial fibrillation during rehab and he was sent to the emergency room.  By the time he reached the emergency room he was in sinus rhythm.  Since then he had no recurrence of atrial fibrillation.  Currently patient is very active with no cardiac symptoms.  His last echocardiogram in February 2022 was completely normal with normal function and no regional wall motion abnormality.  No significant valvular abnormalities.  Patient's current cardiac medications are atorvastatin 80 mg daily and aspirin 81 mg daily.  Patient states that he did some research and high-dose of statin can cause memory impairment and other side effects.  He would like to cut it down to 40 mg a day.  We will cut down atorvastatin to 40 mg daily.  Patient will return to clinic in 1 year with a repeat lipid profile and LFTs.  Meanwhile he is advised to continue Lipitor 40 mg daily and aspirin at low-dose.  Total visit duration 30 minutes.  This included face-to-face interview, physical exam, chart review, review of echocardiogram and documentation.

## 2023-03-09 NOTE — NURSING NOTE
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       Initial /76 (BP Location: Left arm, Patient Position: Chair, Cuff Size: Adult Regular)   Pulse 65   Wt 97.5 kg (215 lb)   SpO2 99%   BMI 27.01 kg/m   Estimated body mass index is 27.01 kg/m  as calculated from the following:    Height as of 3/24/22: 1.9 m (6' 2.8\").    Weight as of this encounter: 97.5 kg (215 lb)..  BP completed using cuff size: regular    NAGI Barron  "

## 2023-03-09 NOTE — TELEPHONE ENCOUNTER
Patient Quality Outreach    Patient is due for the following:   Colon Cancer Screening  Physical Preventive Adult Physical      Topic Date Due     Hepatitis B Vaccine (1 of 3 - 3-dose series) Never done     COVID-19 Vaccine (1) Never done     Pneumococcal Vaccine (1 - PCV) Never done     Flu Vaccine (1) Never done       Next Steps:   Schedule a office visit for Colon cancer screening, immunizations and  Adult Preventative    Type of outreach:    Sent Knip message.      Questions for provider review:    None     Lilliana Mo

## 2024-03-16 ENCOUNTER — HEALTH MAINTENANCE LETTER (OUTPATIENT)
Age: 49
End: 2024-03-16

## 2024-03-19 ENCOUNTER — LAB (OUTPATIENT)
Dept: LAB | Facility: CLINIC | Age: 49
End: 2024-03-19
Payer: COMMERCIAL

## 2024-03-19 DIAGNOSIS — I21.4 NSTEMI (NON-ST ELEVATED MYOCARDIAL INFARCTION) (H): ICD-10-CM

## 2024-03-19 LAB
ALBUMIN SERPL BCG-MCNC: 4.8 G/DL (ref 3.5–5.2)
ALP SERPL-CCNC: 52 U/L (ref 40–150)
ALT SERPL W P-5'-P-CCNC: 14 U/L (ref 0–70)
AST SERPL W P-5'-P-CCNC: 21 U/L (ref 0–45)
BILIRUB DIRECT SERPL-MCNC: 0.22 MG/DL (ref 0–0.3)
BILIRUB SERPL-MCNC: 1.2 MG/DL
CHOLEST SERPL-MCNC: 265 MG/DL
FASTING STATUS PATIENT QL REPORTED: YES
HDLC SERPL-MCNC: 35 MG/DL
LDLC SERPL CALC-MCNC: 192 MG/DL
NONHDLC SERPL-MCNC: 230 MG/DL
PROT SERPL-MCNC: 7.8 G/DL (ref 6.4–8.3)
TRIGL SERPL-MCNC: 191 MG/DL

## 2024-03-19 PROCEDURE — 80076 HEPATIC FUNCTION PANEL: CPT

## 2024-03-19 PROCEDURE — 36415 COLL VENOUS BLD VENIPUNCTURE: CPT

## 2024-03-19 PROCEDURE — 80061 LIPID PANEL: CPT

## 2024-03-21 ENCOUNTER — OFFICE VISIT (OUTPATIENT)
Dept: CARDIOLOGY | Facility: CLINIC | Age: 49
End: 2024-03-21
Payer: COMMERCIAL

## 2024-03-21 VITALS
BODY MASS INDEX: 27.85 KG/M2 | WEIGHT: 224 LBS | DIASTOLIC BLOOD PRESSURE: 73 MMHG | HEIGHT: 75 IN | HEART RATE: 64 BPM | SYSTOLIC BLOOD PRESSURE: 109 MMHG | OXYGEN SATURATION: 98 %

## 2024-03-21 DIAGNOSIS — Z95.1 STATUS POST AORTO-CORONARY ARTERY BYPASS GRAFT: Primary | ICD-10-CM

## 2024-03-21 DIAGNOSIS — E78.5 HYPERLIPIDEMIA LDL GOAL <70: ICD-10-CM

## 2024-03-21 PROCEDURE — 99214 OFFICE O/P EST MOD 30 MIN: CPT | Performed by: INTERNAL MEDICINE

## 2024-03-21 NOTE — LETTER
3/21/2024      RE: Keegan Gallo  [address] [facility] [address]       Dear Colleague,    Thank you for the opportunity to participate in the care of your patient, Keegan Gallo, at the [facility]. Please see a copy of my visit note below.       SUBJECTIVE:  Keegan Gallo is a 48 year old male who presents for yearly follow-up. Status post CABG x1, LIMA to LAD on 7/1/2021.     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  The days he lifts weight he runs 2 to 3 miles.  In no weightlifting he will run 5 to 8 miles.  Lately he had some chest discomfort at around 3 miles of running.  He walks a little bit and then he will be able to continue and finish his run.  Also used to feel weak legs with running.  On 28 June last  year after running at school patient had a syncopal episode.  He was admitted to [facility].  He had mild troponin elevation(high-sensitivity troponin peaked around 260) suggestive of an NSTEMI..  Subsequently patient had a CT coronary angiogram which showed severe proximal LAD stenosis.  Subsequently patient had a coronary angiogram and the team was uncomfortable to proceed with the stent.  So patient had single-vessel CABG using LIMA to LAD on 7/1/2021.  Postop patient had significant bleeding through the chest tubes and has to be reopen.  Since then patient remain asymptomatic.  Patient did have few episodes of atrial fibrillation.  Last episode was when patient was in cardiac rehab.  Patient was sent to the emergency room though on arrival he was in sinus rhythm with a rate of around 55 bpm.  Patient do have intermittent atrial fibrillation.  He was started on amiodarone 200 mg daily and Eliquis.  Currently patient of another 26 days of medications left.  His cardiac risk factors are hyperlipidemia and a strong  family history of premature coronary artery disease.  Father had 5 vessel CABG at age 60.  He is a non-smoker.  No hypertension or diabetes.    Patient was on Lipitor 80 mg in the past.  As he had excellent lipid profile on this dose from patient wanted to cut down the dose as he do not like taking statins.  A compromise was reached and he was advised to continue with the 40 mg.  Lately patient discontinued this.  According to him he had hip joints and he was unable to run latest lipid profile showed HDL of 35 and LDL of 192.    Patient Active Problem List    Diagnosis Date Noted    PAF (paroxysmal atrial fibrillation) (H) 08/02/2021     Priority: Medium    Coronary artery disease involving native heart without angina pectoris, unspecified vessel or lesion type 07/08/2021     Priority: Medium    S/P CABG (coronary artery bypass graft) 07/08/2021     Priority: Medium    Family history of glaucoma in mother 09/11/2017     Priority: Medium    Giant papillary conjunctivitis left eye 08/29/2016     Priority: Medium    High triglycerides 09/22/2015     Priority: Medium    CARDIOVASCULAR SCREENING; LDL GOAL LESS THAN 160 08/26/2015     Priority: Medium    Exotropia, alternating 08/24/2015     Priority: Medium    .  Current Outpatient Medications   Medication Sig    aspirin 81 MG EC tablet Take 81 mg by mouth daily    Blood Pressure Monitor KIT 1 kit daily    Cholecalciferol (VITAMIN D3) 25 MCG (1000 UT) CAPS     nitroGLYcerin (NITROSTAT) 0.4 MG sublingual tablet Place 0.4 mg under the tongue    vitamin C (ASCORBIC ACID) 100 MG tablet Take 100 mg by mouth 3 times daily    Zinc Sulfate (ZINC 15) 66 MG TABS     atorvastatin (LIPITOR) 40 MG tablet Take 1 tablet (40 mg) by mouth daily (Patient not taking: Reported on 3/21/2024)     No current facility-administered medications for this visit.     Past Medical History:   Diagnosis Date    Heart disease 6-28-21    Had bypass surgery on 7-1-21    History of blood transfusion 7-1-21     1 unit blood 1 unit plasma    Strabismus      Past Surgical History:   Procedure Laterality Date    ABDOMEN SURGERY      [place] when i was a baby    ADENOIDECTOMY      AMPUTATION  9-11-98    First joint left digit one of doot (bog toe)    CARDIAC SURGERY  7-1-21    Single bypass surgery    EYE SURGERY      ORTHOPEDIC SURGERY      STRABISMUS SURGERY Bilateral     age 4, 5, one SX one eye and two surgeries other eye     No Known Allergies  Social History     Socioeconomic History    Marital status:      Spouse name: Not on file    Number of children: Not on file    Years of education: Not on file    Highest education level: Not on file   Occupational History    Not on file   Tobacco Use    Smoking status: Never    Smokeless tobacco: Former     Quit date: 8/1/2000   Vaping Use    Vaping Use: Never used   Substance and Sexual Activity    Alcohol use: Yes     Alcohol/week: 0.0 standard drinks of alcohol     Comment: rarely    Drug use: No    Sexual activity: Yes     Partners: Female   Other Topics Concern    Parent/sibling w/ CABG, MI or angioplasty before 65F 55M? No   Social History Narrative    Not on file     Social Determinants of Health     Financial Resource Strain: Not on file   Food Insecurity: Not on file   Transportation Needs: Not on file   Physical Activity: Not on file   Stress: Not on file   Social Connections: Not on file   Interpersonal Safety: Not on file   Housing Stability: Not on file     Family History   Problem Relation Age of Onset    Glaucoma Mother         rx drops    Hyperlipidemia Father     Hypertension Father     Heart Surgery Father     Myocardial Infarction Father     Macular Degeneration Maternal Grandfather         vision loss    Glaucoma Maternal Grandfather         drops    Cancer Maternal Aunt     Cancer Maternal Uncle     Cancer Paternal Aunt     Cancer Paternal Uncle     Diabetes No family hx of     Cerebrovascular Disease No family hx of     Thyroid Disease No family hx  "of           REVIEW OF SYSTEMS:  General: negative, fever, chills, night sweats  Skin: negative, acne, rash, and scaling  Eyes: negative, double vision, eye pain, and photophobia  Ears/Nose/Throat: negative, nasal congestion, and purulent rhinorrhea  Respiratory: No dyspnea on exertion, No cough, No hemoptysis, and negative  Cardiovascular: negative, palpitations, tachycardia, irregular heart beat, chest pain, exertional chest pain or pressure, paroxysmal nocturnal dyspnea, dyspnea on exertion, and orthopnea       OBJECTIVE:  Blood pressure 109/73, pulse 64, height 1.905 m (6' 3\"), weight 101.6 kg (224 lb), SpO2 98%.  General Appearance: healthy, alert, active, and no distress  Head: Normocephalic. No masses, lesions, tenderness or abnormalities  Eyes: conjuctiva clear, PERRL, EOM intact  Ears: External ears normal. Canals clear. TM's normal.  Nose: Nares normal  Mouth: normal  Neck: Supple, no cervical adenopathy, no thyromegaly  Lungs: clear to auscultation  Cardiac: regular rate and rhythm, normal S1 and S2, no murmur       ASSESSMENT/PLAN:  Patient here for yearly follow-up.  Status post CABG x 1, LIMA to LAD in 2021.  Patient had severe proximal LAD disease and the interventional team was uncomfortable to proceed with PCI.  Subsequently patient had CABG.  Postop patient had some bleeding as well as atrial fibrillation but this all settled.  Currently patient is very active and exercises regularly and runs.  No cardiac symptoms.  Major issue is that patient do not like statin.  Initially he was on 80 mg of Lipitor and this was cut down to 40 mg as he was very reluctant to take 80 mg.  As patient had an excellent lipid profile he was advised to have 40 mg which he discontinued.  Currently not on any statin and latest lipid profile shows HDL of 35 and LDL of 192.  Lipid profile discussed with patient and the dangers of elevated lipids discussed with the patient and he is fully aware of it.  He do not want to take " statin as it causes hip.  Pain and makes him unable to run.  Suggested PCSK9 inhibitor.  Patient not interested.  He want to think and read about the side effects and then decide.  Dangers of not being on a lipid-lowering medication discussed with patient.  According to him he wanted to try nonmedical methods to control his lipids.  He was thinking about apple cider vinegar.  Discussed the fact that the statins and PCSK9 inhibitors are the most potent drugs and sometimes they fail to control the lipid profile.  But patient will contact the clinic if he decides to proceed with PCSK9.  Currently patient is taking only aspirin 81 mg daily.  As he is very reluctant to take medical advice advised patient to return to clinic on a as needed basis.  Patient is known to have normal biventricular function and no regional wall motion abnormality.  No significant valvular abnormalities.  Total visit duration 30 minutes.  This included face-to-face interview, physical exam, chart review, review of prior echocardiogram and documentation.      Please do not hesitate to contact me if you have any questions/concerns.     Sincerely,     SURESH Mojica MD

## 2024-03-21 NOTE — NURSING NOTE
"Chief Complaint   Patient presents with    Annual Visit     Occasional dizziness upon standing - short lived, dizziness when first laying down sometimes. Not taking statin due to side effects.        Initial /73   Pulse 64   Ht 1.905 m (6' 3\")   Wt 101.6 kg (224 lb)   SpO2 98%   BMI 28.00 kg/m   Estimated body mass index is 28 kg/m  as calculated from the following:    Height as of this encounter: 1.905 m (6' 3\").    Weight as of this encounter: 101.6 kg (224 lb)..  BP completed using cuff size: osmany Jordan CMA (AAMA)    " [Time Spent: ___ minutes] : I have spent [unfilled] minutes of time on the encounter. [>50% of the face to face encounter time was spent on counseling and/or coordination of care for ___] : Greater than 50% of the face to face encounter time was spent on counseling and/or coordination of care for [unfilled]

## 2024-03-21 NOTE — PATIENT INSTRUCTIONS
Thank you for coming to the [facility] at [place]; please note the following instructions:    1. Follow-up on an as needed basis        If you have any questions regarding your visit, please contact your care team:     CARDIOLOGY  TELEPHONE NUMBER   Yarelis TALLEYDianne, Registered Nurse  Althea MOURA, Registered Nurse  Lluvia SIEGEL, Registered Nurse  Lori BONNER, Registered Medical Assistant  Tierra LYN, Certified Medical Assistant  Deja SIMON, Clinic Assistant [phone] (select option 1)    *After hours: [phone]   For Scheduling Appts:     [phone] (select option 1)    *After hours: [phone]   For the Device Clinic (Pacemakers and ICD's)  Tona MCPHERSON, Registered Nurse   During business hours: [phone]    *After business hours:  [phone] (select option 4)      Normal test result notifications will be released via SwapDrive or mailed within 7 business days.  All other test results, will be communicated via telephone once reviewed by your cardiologist.    If you need a medication refill, please contact your pharmacy.  Please allow 3 business days for your refill to be completed.    As always, thank you for trusting us with your health care needs!

## 2024-03-22 NOTE — PROGRESS NOTES
SUBJECTIVE:  Keegan Gallo is a 48 year old male who presents for yearly follow-up. Status post CABG x1, LIMA to LAD on 7/1/2021.     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  The days he lifts weight he runs 2 to 3 miles.  In no weightlifting he will run 5 to 8 miles.  Lately he had some chest discomfort at around 3 miles of running.  He walks a little bit and then he will be able to continue and finish his run.  Also used to feel weak legs with running.  On 28 June last  year after running at school patient had a syncopal episode.  He was admitted to [facility].  He had mild troponin elevation(high-sensitivity troponin peaked around 260) suggestive of an NSTEMI..  Subsequently patient had a CT coronary angiogram which showed severe proximal LAD stenosis.  Subsequently patient had a coronary angiogram and the team was uncomfortable to proceed with the stent.  So patient had single-vessel CABG using LIMA to LAD on 7/1/2021.  Postop patient had significant bleeding through the chest tubes and has to be reopen.  Since then patient remain asymptomatic.  Patient did have few episodes of atrial fibrillation.  Last episode was when patient was in cardiac rehab.  Patient was sent to the emergency room though on arrival he was in sinus rhythm with a rate of around 55 bpm.  Patient do have intermittent atrial fibrillation.  He was started on amiodarone 200 mg daily and Eliquis.  Currently patient of another 26 days of medications left.  His cardiac risk factors are hyperlipidemia and a strong family history of premature coronary artery disease.  Father had 5 vessel CABG at age 60.  He is a non-smoker.  No hypertension or diabetes.    Patient was on Lipitor 80 mg in the past.  As he had excellent lipid profile on this dose from patient wanted to cut down the dose as he do not like taking statins.  A compromise was reached and he was  advised to continue with the 40 mg.  Lately patient discontinued this.  According to him he had hip joints and he was unable to run latest lipid profile showed HDL of 35 and LDL of 192.    Patient Active Problem List    Diagnosis Date Noted    PAF (paroxysmal atrial fibrillation) (H) 08/02/2021     Priority: Medium    Coronary artery disease involving native heart without angina pectoris, unspecified vessel or lesion type 07/08/2021     Priority: Medium    S/P CABG (coronary artery bypass graft) 07/08/2021     Priority: Medium    Family history of glaucoma in mother 09/11/2017     Priority: Medium    Giant papillary conjunctivitis left eye 08/29/2016     Priority: Medium    High triglycerides 09/22/2015     Priority: Medium    CARDIOVASCULAR SCREENING; LDL GOAL LESS THAN 160 08/26/2015     Priority: Medium    Exotropia, alternating 08/24/2015     Priority: Medium    .  Current Outpatient Medications   Medication Sig    aspirin 81 MG EC tablet Take 81 mg by mouth daily    Blood Pressure Monitor KIT 1 kit daily    Cholecalciferol (VITAMIN D3) 25 MCG (1000 UT) CAPS     nitroGLYcerin (NITROSTAT) 0.4 MG sublingual tablet Place 0.4 mg under the tongue    vitamin C (ASCORBIC ACID) 100 MG tablet Take 100 mg by mouth 3 times daily    Zinc Sulfate (ZINC 15) 66 MG TABS     atorvastatin (LIPITOR) 40 MG tablet Take 1 tablet (40 mg) by mouth daily (Patient not taking: Reported on 3/21/2024)     No current facility-administered medications for this visit.     Past Medical History:   Diagnosis Date    Heart disease 6-28-21    Had bypass surgery on 7-1-21    History of blood transfusion 7-1-21    1 unit blood 1 unit plasma    Strabismus      Past Surgical History:   Procedure Laterality Date    ABDOMEN SURGERY      [place] when i was a baby    ADENOIDECTOMY      AMPUTATION  9-11-98    First joint left digit one of doot (bog toe)    CARDIAC SURGERY  7-1-21    Single bypass surgery    EYE SURGERY      ORTHOPEDIC SURGERY       STRABISMUS SURGERY Bilateral     age 4, 5, one SX one eye and two surgeries other eye     No Known Allergies  Social History     Socioeconomic History    Marital status:      Spouse name: Not on file    Number of children: Not on file    Years of education: Not on file    Highest education level: Not on file   Occupational History    Not on file   Tobacco Use    Smoking status: Never    Smokeless tobacco: Former     Quit date: 8/1/2000   Vaping Use    Vaping Use: Never used   Substance and Sexual Activity    Alcohol use: Yes     Alcohol/week: 0.0 standard drinks of alcohol     Comment: rarely    Drug use: No    Sexual activity: Yes     Partners: Female   Other Topics Concern    Parent/sibling w/ CABG, MI or angioplasty before 65F 55M? No   Social History Narrative    Not on file     Social Determinants of Health     Financial Resource Strain: Not on file   Food Insecurity: Not on file   Transportation Needs: Not on file   Physical Activity: Not on file   Stress: Not on file   Social Connections: Not on file   Interpersonal Safety: Not on file   Housing Stability: Not on file     Family History   Problem Relation Age of Onset    Glaucoma Mother         rx drops    Hyperlipidemia Father     Hypertension Father     Heart Surgery Father     Myocardial Infarction Father     Macular Degeneration Maternal Grandfather         vision loss    Glaucoma Maternal Grandfather         drops    Cancer Maternal Aunt     Cancer Maternal Uncle     Cancer Paternal Aunt     Cancer Paternal Uncle     Diabetes No family hx of     Cerebrovascular Disease No family hx of     Thyroid Disease No family hx of           REVIEW OF SYSTEMS:  General: negative, fever, chills, night sweats  Skin: negative, acne, rash, and scaling  Eyes: negative, double vision, eye pain, and photophobia  Ears/Nose/Throat: negative, nasal congestion, and purulent rhinorrhea  Respiratory: No dyspnea on exertion, No cough, No hemoptysis, and  "negative  Cardiovascular: negative, palpitations, tachycardia, irregular heart beat, chest pain, exertional chest pain or pressure, paroxysmal nocturnal dyspnea, dyspnea on exertion, and orthopnea       OBJECTIVE:  Blood pressure 109/73, pulse 64, height 1.905 m (6' 3\"), weight 101.6 kg (224 lb), SpO2 98%.  General Appearance: healthy, alert, active, and no distress  Head: Normocephalic. No masses, lesions, tenderness or abnormalities  Eyes: conjuctiva clear, PERRL, EOM intact  Ears: External ears normal. Canals clear. TM's normal.  Nose: Nares normal  Mouth: normal  Neck: Supple, no cervical adenopathy, no thyromegaly  Lungs: clear to auscultation  Cardiac: regular rate and rhythm, normal S1 and S2, no murmur       ASSESSMENT/PLAN:  Patient here for yearly follow-up.  Status post CABG x 1, LIMA to LAD in 2021.  Patient had severe proximal LAD disease and the interventional team was uncomfortable to proceed with PCI.  Subsequently patient had CABG.  Postop patient had some bleeding as well as atrial fibrillation but this all settled.  Currently patient is very active and exercises regularly and runs.  No cardiac symptoms.  Major issue is that patient do not like statin.  Initially he was on 80 mg of Lipitor and this was cut down to 40 mg as he was very reluctant to take 80 mg.  As patient had an excellent lipid profile he was advised to have 40 mg which he discontinued.  Currently not on any statin and latest lipid profile shows HDL of 35 and LDL of 192.  Lipid profile discussed with patient and the dangers of elevated lipids discussed with the patient and he is fully aware of it.  He do not want to take statin as it causes hip.  Pain and makes him unable to run.  Suggested PCSK9 inhibitor.  Patient not interested.  He want to think and read about the side effects and then decide.  Dangers of not being on a lipid-lowering medication discussed with patient.  According to him he wanted to try nonmedical methods to " control his lipids.  He was thinking about apple cider vinegar.  Discussed the fact that the statins and PCSK9 inhibitors are the most potent drugs and sometimes they fail to control the lipid profile.  But patient will contact the clinic if he decides to proceed with PCSK9.  Currently patient is taking only aspirin 81 mg daily.  As he is very reluctant to take medical advice advised patient to return to clinic on a as needed basis.  Patient is known to have normal biventricular function and no regional wall motion abnormality.  No significant valvular abnormalities.  Total visit duration 30 minutes.  This included face-to-face interview, physical exam, chart review, review of prior echocardiogram and documentation.

## 2024-06-03 ENCOUNTER — OFFICE VISIT (OUTPATIENT)
Dept: OPTOMETRY | Facility: CLINIC | Age: 49
End: 2024-06-03
Payer: COMMERCIAL

## 2024-06-03 ENCOUNTER — APPOINTMENT (OUTPATIENT)
Dept: OPTOMETRY | Facility: CLINIC | Age: 49
End: 2024-06-03
Payer: COMMERCIAL

## 2024-06-03 DIAGNOSIS — H52.4 PRESBYOPIA: ICD-10-CM

## 2024-06-03 DIAGNOSIS — H52.13 MYOPIA OF BOTH EYES: ICD-10-CM

## 2024-06-03 DIAGNOSIS — H52.223 REGULAR ASTIGMATISM OF BOTH EYES: ICD-10-CM

## 2024-06-03 DIAGNOSIS — Z01.00 ROUTINE EYE EXAM: Primary | ICD-10-CM

## 2024-06-03 PROCEDURE — V2100 LENS SPHER SINGLE PLANO 4.00: HCPCS | Mod: RT | Performed by: OPTOMETRIST

## 2024-06-03 PROCEDURE — 92014 COMPRE OPH EXAM EST PT 1/>: CPT | Performed by: OPTOMETRIST

## 2024-06-03 PROCEDURE — V2020 VISION SVCS FRAMES PURCHASES: HCPCS | Performed by: OPTOMETRIST

## 2024-06-03 RX ORDER — NIACIN 250 MG
250 TABLET ORAL
COMMUNITY

## 2024-06-03 ASSESSMENT — REFRACTION_MANIFEST
OD_CYLINDER: +1.75
OS_SPHERE: -3.50
OD_ADD: +1.00
OD_CYLINDER: +2.00
OD_AXIS: 013
OD_SPHERE: -4.00
OS_AXIS: 176
OS_AXIS: 170
OS_SPHERE: -2.75
OS_ADD: +1.00
OD_AXIS: 025
METHOD_AUTOREFRACTION: 1
OS_CYLINDER: +1.25
OS_CYLINDER: +0.75
OD_SPHERE: -4.50

## 2024-06-03 ASSESSMENT — REFRACTION_WEARINGRX
OS_AXIS: 172
OD_SPHERE: -3.75
OD_CYLINDER: +1.50
SPECS_TYPE: SVL
OS_SPHERE: -2.75
OD_AXIS: 025
OS_CYLINDER: +0.75

## 2024-06-03 ASSESSMENT — VISUAL ACUITY
OS_CC: 20/25
OD_CC: 20/25
CORRECTION_TYPE: GLASSES
OD_CC+: -1
OD_SC: 20/30
METHOD: SNELLEN - LINEAR
OS_SC: 20/20
OS_CC+: -1

## 2024-06-03 ASSESSMENT — CUP TO DISC RATIO
OD_RATIO: 0.6
OS_RATIO: 0.65

## 2024-06-03 ASSESSMENT — CONF VISUAL FIELD
METHOD: COUNTING FINGERS
OS_NORMAL: 1
OS_SUPERIOR_TEMPORAL_RESTRICTION: 0
OS_INFERIOR_TEMPORAL_RESTRICTION: 0
OD_INFERIOR_NASAL_RESTRICTION: 0
OS_INFERIOR_NASAL_RESTRICTION: 0
OD_SUPERIOR_NASAL_RESTRICTION: 0
OS_SUPERIOR_NASAL_RESTRICTION: 0
OD_NORMAL: 1
OD_SUPERIOR_TEMPORAL_RESTRICTION: 0
OD_INFERIOR_TEMPORAL_RESTRICTION: 0

## 2024-06-03 ASSESSMENT — EXTERNAL EXAM - LEFT EYE: OS_EXAM: NORMAL

## 2024-06-03 ASSESSMENT — TONOMETRY
OD_IOP_MMHG: 14
OS_IOP_MMHG: 14
IOP_METHOD: APPLANATION

## 2024-06-03 ASSESSMENT — SLIT LAMP EXAM - LIDS
COMMENTS: NORMAL
COMMENTS: NORMAL

## 2024-06-03 ASSESSMENT — KERATOMETRY
OS_K2POWER_DIOPTERS: 47.75
OD_AXISANGLE2_DEGREES: 14
OD_K2POWER_DIOPTERS: 47.00
OS_K1POWER_DIOPTERS: 48.25
OD_K1POWER_DIOPTERS: 48.25
OS_AXISANGLE2_DEGREES: 5

## 2024-06-03 ASSESSMENT — EXTERNAL EXAM - RIGHT EYE: OD_EXAM: NORMAL

## 2024-06-03 NOTE — LETTER
6/3/2024         RE: Keegan Gallo  [address] [facility] [address]        Dear Colleague,    Thank you for referring your patient, Keegan Gallo, to the [facility]. Please see a copy of my visit note below.    Chief Complaint   Patient presents with     COMPREHENSIVE EYE EXAM         Last Eye Exam: 8/2022  Dilated Previously: Yes    What are you currently using to see?  Glasses and occasionally contacts. Does not want a fitting today        Distance Vision Acuity: Satisfied with vision    Near Vision Acuity: Satisfied with vision while reading and using computer unaided, usually takes the glasses off. Said that the contacts are a little better and occasionally he'll put on OTC readers     Eye Comfort: good  Do you use eye drops? : No  Occupation or Hobbies: Teacher     Tracy Ly Optometric Assistant           Medical, surgical and family histories reviewed and updated 6/3/2024.       Had 2 strabismus surgeries, at age 4 and 5    Mother has glaucoma     OBJECTIVE: See Ophthalmology exam    ASSESSMENT:    ICD-10-CM    1. Routine eye exam  Z01.00       2. Myopia of both eyes  H52.13       3. Regular astigmatism of both eyes  H52.223       4. Presbyopia  H52.4           PLAN:     Patient Instructions   Fill glasses prescription  Allow 2 weeks to adapt to change in glasses  Return in 1 year for eye exam    Lilliana Alejandro, OD  [phone]                Again, thank you for allowing me to participate in the care of your patient.        Sincerely,        Lilliana Alejandro, OD

## 2024-06-03 NOTE — PATIENT INSTRUCTIONS
Fill glasses prescription  Allow 2 weeks to adapt to change in glasses  Return in 1 year for eye exam    Lilliana Alejandro, OD  [phone]

## 2024-06-03 NOTE — PROGRESS NOTES
Chief Complaint   Patient presents with    COMPREHENSIVE EYE EXAM         Last Eye Exam: 8/2022  Dilated Previously: Yes    What are you currently using to see?  Glasses and occasionally contacts. Does not want a fitting today        Distance Vision Acuity: Satisfied with vision    Near Vision Acuity: Satisfied with vision while reading and using computer unaided, usually takes the glasses off. Said that the contacts are a little better and occasionally he'll put on OTC readers     Eye Comfort: good  Do you use eye drops? : No  Occupation or Hobbies: Teacher     Tracy Ly Optometric Assistant           Medical, surgical and family histories reviewed and updated 6/3/2024.       Had 2 strabismus surgeries, at age 4 and 5    Mother has glaucoma     OBJECTIVE: See Ophthalmology exam    ASSESSMENT:    ICD-10-CM    1. Routine eye exam  Z01.00       2. Myopia of both eyes  H52.13       3. Regular astigmatism of both eyes  H52.223       4. Presbyopia  H52.4           PLAN:     Patient Instructions   Fill glasses prescription  Allow 2 weeks to adapt to change in glasses  Return in 1 year for eye exam    Lilliana Alejandro, OD  [phone]

## 2024-06-18 ENCOUNTER — APPOINTMENT (OUTPATIENT)
Dept: OPTOMETRY | Facility: CLINIC | Age: 49
End: 2024-06-18
Payer: COMMERCIAL

## 2024-06-18 PROCEDURE — 92340 FIT SPECTACLES MONOFOCAL: CPT | Performed by: OPTOMETRIST

## 2025-02-05 ENCOUNTER — PATIENT OUTREACH (OUTPATIENT)
Dept: FAMILY MEDICINE | Facility: CLINIC | Age: 50
End: 2025-02-05
Payer: COMMERCIAL

## 2025-02-05 NOTE — TELEPHONE ENCOUNTER
Patient Quality Outreach    Patient is due for the following:   Colon Cancer Screening  Depression  -  PHQ-9 needed  Physical Annual Wellness Visit    Action(s) Taken:   Schedule a Adult Preventative    Type of outreach:    Sent [employer] message.    Questions for provider review:    None           Missy Christy, CMA

## 2025-03-22 ENCOUNTER — HEALTH MAINTENANCE LETTER (OUTPATIENT)
Age: 50
End: 2025-03-22